# Patient Record
Sex: MALE | Race: WHITE | Employment: OTHER | URBAN - NONMETROPOLITAN AREA
[De-identification: names, ages, dates, MRNs, and addresses within clinical notes are randomized per-mention and may not be internally consistent; named-entity substitution may affect disease eponyms.]

---

## 2019-04-10 ENCOUNTER — OFFICE VISIT (OUTPATIENT)
Dept: CARDIOLOGY CLINIC | Age: 69
End: 2019-04-10
Payer: MEDICARE

## 2019-04-10 VITALS
WEIGHT: 208.2 LBS | HEART RATE: 80 BPM | HEIGHT: 71 IN | SYSTOLIC BLOOD PRESSURE: 116 MMHG | DIASTOLIC BLOOD PRESSURE: 72 MMHG | BODY MASS INDEX: 29.15 KG/M2

## 2019-04-10 DIAGNOSIS — I73.9 CLAUDICATION OF BOTH LOWER EXTREMITIES (HCC): ICD-10-CM

## 2019-04-10 DIAGNOSIS — R68.89 ABNORMAL ANKLE BRACHIAL INDEX (ABI): Primary | ICD-10-CM

## 2019-04-10 PROCEDURE — 99204 OFFICE O/P NEW MOD 45 MIN: CPT | Performed by: INTERNAL MEDICINE

## 2019-04-10 RX ORDER — BISOPROLOL FUMARATE 5 MG/1
5 TABLET ORAL DAILY
COMMUNITY
End: 2020-09-17 | Stop reason: SDUPTHER

## 2019-04-10 RX ORDER — EZETIMIBE 10 MG/1
10 TABLET ORAL DAILY
COMMUNITY
End: 2020-09-17 | Stop reason: SDUPTHER

## 2019-04-10 RX ORDER — CLOPIDOGREL BISULFATE 75 MG/1
75 TABLET ORAL DAILY
COMMUNITY
End: 2020-09-17 | Stop reason: SDUPTHER

## 2019-04-10 RX ORDER — CALCIUM CARBONATE/VITAMIN D3 600 MG-10
TABLET ORAL DAILY
Status: ON HOLD | COMMUNITY
End: 2019-05-07 | Stop reason: ALTCHOICE

## 2019-04-10 RX ORDER — ATORVASTATIN CALCIUM 40 MG/1
40 TABLET, FILM COATED ORAL DAILY
Qty: 90 TABLET | Refills: 1 | Status: SHIPPED | OUTPATIENT
Start: 2019-04-10 | End: 2020-10-19 | Stop reason: SDUPTHER

## 2019-04-10 NOTE — PROGRESS NOTES
New Patient, leg swelling and leg pain. CASSY results scanned in under media. Recent EKG, stress and echo under media tab also.
is occurring with less and less exertion and he is unable to walk on a daily basis with even minimal ambulation without having pain. His RLE hurts more than the LLE. He will likely need bilateral intervention. Continue walking program.  Discussed emergency symptoms needing immediate attention. Discussed diet/exercise/BP/weight loss/health lifestyle choices/lipids; the patient understands the goals and will try to comply.      Disposition:  Angiogram  3-6 months         Electronically signed by Vikas German MD   4/10/2019 at 12:46 PM

## 2019-05-07 ENCOUNTER — HOSPITAL ENCOUNTER (OUTPATIENT)
Dept: INTERVENTIONAL RADIOLOGY/VASCULAR | Age: 69
Discharge: HOME | End: 2019-05-07
Attending: INTERNAL MEDICINE | Admitting: INTERNAL MEDICINE
Payer: MEDICARE

## 2019-05-07 VITALS
BODY MASS INDEX: 28.56 KG/M2 | SYSTOLIC BLOOD PRESSURE: 113 MMHG | HEIGHT: 71 IN | DIASTOLIC BLOOD PRESSURE: 63 MMHG | OXYGEN SATURATION: 98 % | TEMPERATURE: 98.4 F | RESPIRATION RATE: 17 BRPM | WEIGHT: 204 LBS | HEART RATE: 75 BPM

## 2019-05-07 DIAGNOSIS — I73.9 PVD (PERIPHERAL VASCULAR DISEASE) (HCC): ICD-10-CM

## 2019-05-07 LAB
ABO: NORMAL
ACTIVATED CLOTTING TIME: 164 SECONDS (ref 1–150)
ANION GAP SERPL CALCULATED.3IONS-SCNC: 15 MEQ/L (ref 8–16)
ANTIBODY SCREEN: NORMAL
BUN BLDV-MCNC: 9 MG/DL (ref 7–22)
CALCIUM SERPL-MCNC: 9.2 MG/DL (ref 8.5–10.5)
CHLORIDE BLD-SCNC: 96 MEQ/L (ref 98–111)
CHOLESTEROL, TOTAL: 117 MG/DL (ref 100–199)
CO2: 20 MEQ/L (ref 23–33)
CREAT SERPL-MCNC: 0.8 MG/DL (ref 0.4–1.2)
ERYTHROCYTE [DISTWIDTH] IN BLOOD BY AUTOMATED COUNT: 12.9 % (ref 11.5–14.5)
ERYTHROCYTE [DISTWIDTH] IN BLOOD BY AUTOMATED COUNT: 43.8 FL (ref 35–45)
GFR SERPL CREATININE-BSD FRML MDRD: > 90 ML/MIN/1.73M2
GLUCOSE BLD-MCNC: 107 MG/DL (ref 70–108)
HCT VFR BLD CALC: 36.8 % (ref 42–52)
HDLC SERPL-MCNC: 66 MG/DL
HEMOGLOBIN: 12.8 GM/DL (ref 14–18)
INR BLD: 1.21 (ref 0.85–1.13)
LDL CHOLESTEROL CALCULATED: 37 MG/DL
MCH RBC QN AUTO: 32.7 PG (ref 26–33)
MCHC RBC AUTO-ENTMCNC: 34.8 GM/DL (ref 32.2–35.5)
MCV RBC AUTO: 93.9 FL (ref 80–94)
PLATELET # BLD: 135 THOU/MM3 (ref 130–400)
PMV BLD AUTO: 9.8 FL (ref 9.4–12.4)
POTASSIUM REFLEX MAGNESIUM: 4 MEQ/L (ref 3.5–5.2)
RBC # BLD: 3.92 MILL/MM3 (ref 4.7–6.1)
RH FACTOR: NORMAL
SODIUM BLD-SCNC: 131 MEQ/L (ref 135–145)
TRIGL SERPL-MCNC: 71 MG/DL (ref 0–199)
WBC # BLD: 5.6 THOU/MM3 (ref 4.8–10.8)

## 2019-05-07 PROCEDURE — 85610 PROTHROMBIN TIME: CPT

## 2019-05-07 PROCEDURE — C1894 INTRO/SHEATH, NON-LASER: HCPCS

## 2019-05-07 PROCEDURE — 36200 PLACE CATHETER IN AORTA: CPT | Performed by: INTERNAL MEDICINE

## 2019-05-07 PROCEDURE — 75625 CONTRAST EXAM ABDOMINL AORTA: CPT | Performed by: INTERNAL MEDICINE

## 2019-05-07 PROCEDURE — 80048 BASIC METABOLIC PNL TOTAL CA: CPT

## 2019-05-07 PROCEDURE — 6360000004 HC RX CONTRAST MEDICATION: Performed by: INTERNAL MEDICINE

## 2019-05-07 PROCEDURE — 75716 ARTERY X-RAYS ARMS/LEGS: CPT | Performed by: INTERNAL MEDICINE

## 2019-05-07 PROCEDURE — 86900 BLOOD TYPING SEROLOGIC ABO: CPT

## 2019-05-07 PROCEDURE — 36140 INTRO NDL ICATH UPR/LXTR ART: CPT | Performed by: INTERNAL MEDICINE

## 2019-05-07 PROCEDURE — 85027 COMPLETE CBC AUTOMATED: CPT

## 2019-05-07 PROCEDURE — 2500000003 HC RX 250 WO HCPCS

## 2019-05-07 PROCEDURE — 85347 COAGULATION TIME ACTIVATED: CPT

## 2019-05-07 PROCEDURE — 86850 RBC ANTIBODY SCREEN: CPT

## 2019-05-07 PROCEDURE — 80061 LIPID PANEL: CPT

## 2019-05-07 PROCEDURE — 6360000002 HC RX W HCPCS: Performed by: INTERNAL MEDICINE

## 2019-05-07 PROCEDURE — 36415 COLL VENOUS BLD VENIPUNCTURE: CPT

## 2019-05-07 PROCEDURE — 2709999900 HC NON-CHARGEABLE SUPPLY

## 2019-05-07 PROCEDURE — 6360000002 HC RX W HCPCS

## 2019-05-07 PROCEDURE — 2580000003 HC RX 258: Performed by: INTERNAL MEDICINE

## 2019-05-07 PROCEDURE — 75630 X-RAY AORTA LEG ARTERIES: CPT | Performed by: INTERNAL MEDICINE

## 2019-05-07 PROCEDURE — 86901 BLOOD TYPING SEROLOGIC RH(D): CPT

## 2019-05-07 PROCEDURE — C1769 GUIDE WIRE: HCPCS

## 2019-05-07 RX ORDER — SODIUM CHLORIDE 0.9 % (FLUSH) 0.9 %
10 SYRINGE (ML) INJECTION PRN
Status: DISCONTINUED | OUTPATIENT
Start: 2019-05-07 | End: 2019-05-07 | Stop reason: HOSPADM

## 2019-05-07 RX ORDER — ACETAMINOPHEN 325 MG/1
650 TABLET ORAL EVERY 4 HOURS PRN
Status: DISCONTINUED | OUTPATIENT
Start: 2019-05-07 | End: 2019-05-07 | Stop reason: HOSPADM

## 2019-05-07 RX ORDER — ATROPINE SULFATE 0.4 MG/ML
0.5 AMPUL (ML) INJECTION
Status: DISCONTINUED | OUTPATIENT
Start: 2019-05-07 | End: 2019-05-07 | Stop reason: HOSPADM

## 2019-05-07 RX ORDER — SODIUM CHLORIDE 0.9 % (FLUSH) 0.9 %
10 SYRINGE (ML) INJECTION EVERY 12 HOURS SCHEDULED
Status: DISCONTINUED | OUTPATIENT
Start: 2019-05-07 | End: 2019-05-07 | Stop reason: HOSPADM

## 2019-05-07 RX ORDER — HEPARIN SODIUM 1000 [USP'U]/ML
5000 INJECTION, SOLUTION INTRAVENOUS; SUBCUTANEOUS ONCE
Status: COMPLETED | OUTPATIENT
Start: 2019-05-07 | End: 2019-05-07

## 2019-05-07 RX ORDER — MIDAZOLAM HYDROCHLORIDE 1 MG/ML
2 INJECTION INTRAMUSCULAR; INTRAVENOUS ONCE
Status: COMPLETED | OUTPATIENT
Start: 2019-05-07 | End: 2019-05-07

## 2019-05-07 RX ORDER — ASPIRIN 81 MG/1
324 TABLET, CHEWABLE ORAL ONCE
Status: DISCONTINUED | OUTPATIENT
Start: 2019-05-07 | End: 2019-05-07 | Stop reason: HOSPADM

## 2019-05-07 RX ORDER — SODIUM CHLORIDE 0.9 % (FLUSH) 0.9 %
10 SYRINGE (ML) INJECTION EVERY 12 HOURS SCHEDULED
Status: DISCONTINUED | OUTPATIENT
Start: 2019-05-07 | End: 2019-05-07 | Stop reason: SDUPTHER

## 2019-05-07 RX ORDER — SODIUM CHLORIDE 9 MG/ML
INJECTION, SOLUTION INTRAVENOUS CONTINUOUS
Status: DISCONTINUED | OUTPATIENT
Start: 2019-05-07 | End: 2019-05-07 | Stop reason: ALTCHOICE

## 2019-05-07 RX ORDER — SODIUM CHLORIDE 9 MG/ML
INJECTION, SOLUTION INTRAVENOUS CONTINUOUS
Status: DISCONTINUED | OUTPATIENT
Start: 2019-05-07 | End: 2019-05-07 | Stop reason: HOSPADM

## 2019-05-07 RX ORDER — FENTANYL CITRATE 50 UG/ML
100 INJECTION, SOLUTION INTRAMUSCULAR; INTRAVENOUS ONCE
Status: COMPLETED | OUTPATIENT
Start: 2019-05-07 | End: 2019-05-07

## 2019-05-07 RX ADMIN — MIDAZOLAM 2 MG: 1 INJECTION INTRAMUSCULAR; INTRAVENOUS at 11:04

## 2019-05-07 RX ADMIN — SODIUM CHLORIDE: 9 INJECTION, SOLUTION INTRAVENOUS at 09:51

## 2019-05-07 RX ADMIN — FENTANYL CITRATE 100 MCG: 50 INJECTION, SOLUTION INTRAMUSCULAR; INTRAVENOUS at 11:04

## 2019-05-07 RX ADMIN — IOPAMIDOL 175 ML: 612 INJECTION, SOLUTION INTRAVENOUS at 12:27

## 2019-05-07 RX ADMIN — HEPARIN SODIUM 5000 UNITS: 1000 INJECTION INTRAVENOUS; SUBCUTANEOUS at 11:40

## 2019-05-07 ASSESSMENT — PAIN SCALES - GENERAL
PAINLEVEL_OUTOF10: 0
PAINLEVEL_OUTOF10: 0

## 2019-05-07 NOTE — FLOWSHEET NOTE
Iv fluids stopped. Iv catheter removed. Telemetry off. Bilateral groins stable. Discharge instructions with AVS review completed. Significant other present. quesitons and concerns addressed. Preparing for discharge.

## 2019-05-07 NOTE — FLOWSHEET NOTE
Patient admitted to 2E02 Ambulatory for arteriogram.  Patient NPO. Patient accompanied by his significant other. Vital signs obtained. Assessment and data collection intiated. Oriented to room. Policies and procedures for 2E explained. All questions answered with no further questions at this time. Fall prevention and safety precautions discussed with patient.

## 2019-05-07 NOTE — PROGRESS NOTES
1205 Sheath in right femoral artery inadvertantly pulled and manual pressure applied per Dr Az Pedro. 1210 Angiogram left leg complete. 1213 Right femoral artery re-accessed with 4 fr sheath. 1222 Procedure complete. ACT drawn. 1226 . Dr Az Pedro informed and ok to pull femoral lines. 1230 Sheath in left femoral artery pulled and manual pressure applied per A [place] RT. No bleeding noted. 1242 Manual pressure removed. No bleeding or hematoma noted. Band-aid applied. 1243 Sheath in right femoral artery pulled and manual pressure applied per A [place] RT. No bleeding noted. Report called to [employer]. 1253 Manual pressure removed. No bleeding or hematoma noted. Band-aid applied. 1256 Pt positioned in bed for comfort. 1307 Transferred to  per bed. Stable condition.

## 2019-05-07 NOTE — FLOWSHEET NOTE
Received from specials. Bilateral groin sites stable. 0.9 normal saline cont. Taking sips of water. Pt aware to keep both legs still and to not cross them or lift his head. Resting with easy resp. Denies pain or needs. Family present.

## 2019-05-07 NOTE — PRE SEDATION
[address] [address]  Sedation/Analgesia History & Physical    Pt Name: Sienna Salazar  Account number: [de-identified]  MRN: [id number]  YOB: 1950  Provider Performing Procedure: Adolfo Santillan MD  Referring Provider: Adolfo Santillan MD   Primary Care Physician: Nilton Badillo  Date: 5/7/2019    PRE-PROCEDURE    Code Status: FULL CODE  Brief History/Pre-Procedure Diagnosis:   Life-limiting claudication--progressing to rest pain  Severe bilateral fem-pop disease    Consent: : I have discussed with the patient risks, benefits, and alternatives (and relevant risks, benefits, and side effects related to alternatives or not receiving care), and likelihood of the success. The patient and/or representative appear to understand and agree to proceed. The discussion encompasses risks, benefits, and side effects related to the alternatives and the risks related to not receiving the proposed care, treatment, and services. The indication, risks and benefits of the procedure and possible therapeutic consequences and alternatives were discussed with the patient. The patient was given the opportunity to ask questions and to have them answered to his/her satisfaction. Risks of the procedure include but are not limited to the following: Bleeding, hematoma including retroperitoneal hemmorhage, infection, pain and discomfort, injury to the aorta and other blood vessels, rhythm disturbance, low blood pressure, myocardial infarction, stroke, kidney damage/failure, myocardial perforation, allergic reactions to sedatives/contrast material, loss of pulse/vascular compromise requiring surgery, aneurysm/pseudoaneurysm formation, possible loss of a limb/hand/leg, needing blood transfusion, requiring emergent open heart surgery or emergent coronary intervention, the need for intubation/respiratory support, the requirement for defibrillation/cardioversion, and death.  Alternatives to and omission of the suggested procedure were discussed. The patient had no further questions and wished to proceed; the consent form was signed. MEDICAL HISTORY  []ASHD/ANGINA/MI/CHF   [x]Hypertension  []Diabetes  [x]Hyperlipidemia  []Smoking  []Family Hx of ASHD  [x]Additional information:       has a past medical history of Arthritis, Electrolyte imbalance, Gout, HTN (hypertension), Hyperlipidemia, Hypochloremia, and Hyponatremia. SURGICAL HISTORY   has a past surgical history that includes back surgery (2017). Additional information:       ALLERGIES   Allergies as of 05/07/2019    (No Known Allergies)     Additional information:       MEDICATIONS   Aspirin  [x] 81 mg  [] 325 mg  [] None  Antiplatelet drug therapy use last 5 days  []No [x]Yes  Coumadin Use Last 5 Days [x]No []Yes  Other anticoagulant use last 5 days  [x]No []Yes    Current Facility-Administered Medications:     0.9 % sodium chloride infusion, , Intravenous, Continuous, Luiza Jose MD, Last Rate: 75 mL/hr at 05/07/19 0951    sodium chloride flush 0.9 % injection 10 mL, 10 mL, Intravenous, 2 times per day, Luiza Jose MD    aspirin chewable tablet 324 mg, 324 mg, Oral, Once, Luiza Jose MD  Prior to Admission medications    Medication Sig Start Date End Date Taking?  Authorizing Provider   Omega-3 Fatty Acids (OMEGA-3 FISH OIL PO) Take 2,080 mg by mouth 2 times daily   Yes Historical Provider, MD   Calcium Carbonate (CALCIUM 600 PO) Take 1 tablet by mouth 2 times daily   Yes Historical Provider, MD   bisoprolol (ZEBETA) 5 MG tablet Take 5 mg by mouth daily   Yes Historical Provider, MD   ezetimibe (ZETIA) 10 MG tablet Take 10 mg by mouth daily   Yes Historical Provider, MD   clopidogrel (PLAVIX) 75 MG tablet Take 75 mg by mouth daily   Yes Historical Provider, MD   atorvastatin (LIPITOR) 40 MG tablet Take 1 tablet by mouth daily 4/10/19  Yes Luiza Jose MD   terazosin (HYTRIN) 5 MG capsule TAKE 1 CAPSULE DAILY 7/12/13  Yes Vida Mosqueda MD lisinopril (PRINIVIL;ZESTRIL) 40 MG tablet Take 40 mg by mouth daily. Yes Historical Provider, MD   aspirin 81 MG tablet Take 81 mg by mouth daily. Yes Historical Provider, MD   amLODIPine (NORVASC) 10 MG tablet Take 10 mg by mouth daily. Yes Historical Provider, MD   GARLIC Take 0,012 mg by mouth daily    Yes Historical Provider, MD     Additional information:       VITAL SIGNS   Vitals:    05/07/19 0945   BP: 126/65   Pulse: 68   Resp: 16   Temp: 98.5 °F (36.9 °C)   SpO2: 97%       PHYSICAL:   General: No acute distress  HEENT:  Unremarkable for age  Neck: without increased JVD, carotid pulses 2+ bilaterally without bruits  Heart: RRR, S1 & S2 WNL, S4 gallop, without murmurs or rubs   Lungs: Clear to auscultation    Abdomen: BS present, without HSM, masses, or tenderness    Extremities: without C,C,E.  Pulses 1+ bilaterally  Mental Status: Alert & Oriented        PLANNED PROCEDURE   []Cath  []PCI                []Pacemaker/AICD  []ACOSTA             []Cardioversion [x]Peripheral angiography/PTA  []Other:      SEDATION  Planned agent:[x]Midazolam []Meperidine [x]Sublimaze []Morphine  []Diazepam  []Other:     ASA Classification:  []1 []2 [x]3 []4 []5  Class 1: A normal healthy patient  Class 2: Pt with mild to moderate systemic disease  Class 3: Severe systemic disease or disturbance  Class 4: Severe systemic disorders that are already life threatening. Class 5: Moribund pt with little chances of survival, for more than 24 hours. Mallampati I Airway Classification:   []1 []2 [x]3 []4    [x]Pre-procedure diagnostic studies complete and results available. Comment:    [x]Previous sedation/anesthesia experiences assessed. Comment:    [x]The patient is an appropriate candidate to undergo the planned procedure sedation and anesthesia.  (Refer to nursing sedation/analgesia documentation record)  [x]Formulation and discussion of sedation/procedure plan, risks, and expectations with patient and/or responsible adult completed. [x]Patient examined immediately prior to the procedure.  (Refer to nursing sedation/analgesia documentation record)    Abdiaziz Peralta MD   Electronically signed 5/7/2019 at 10:41 AM

## 2019-05-07 NOTE — PLAN OF CARE
Problem: Discharge Planning:  Goal: Participates in care planning  Description  Participates in care planning  Outcome: Ongoing  Goal: Discharged to appropriate level of care  Description  Discharged to appropriate level of care  Outcome: Ongoing     Problem: Tissue Perfusion - Cardiopulmonary, Altered:  Goal: Absence of angina  Description  Absence of angina  Outcome: Ongoing  Goal: Hemodynamic stability will improve  Description  Hemodynamic stability will improve  Outcome: Ongoing

## 2019-05-07 NOTE — FLOWSHEET NOTE
Found dtr holding manual pressure to right groin site. Took over pressure with quick clot for 5 minutes. Site stable. dtr states \"it was oozing underbandaid and there is blood in urinal\". No oozing noted per writer.

## 2019-05-07 NOTE — PROGRESS NOTES
1035 pt and family to specials per bed. Procedure explained and consent signed per dr Jeanne Martinez. Monitor applied. Pt noted with pulses with doppler to pedal and post tib. Left pedal is palpable. Pt states he has calf pain and cramps with walking with sometimes a little tingling . pt on asa/plavix   1045 assist pt to table. Right femerol area prepped. 1110  procedure started per dr Jeanne Martinez to right femerol area   1120 left femerol area prepped.     1124 access gained to left femerol area   1200 report given to winter gabriel rn

## 2019-05-08 ENCOUNTER — TELEPHONE (OUTPATIENT)
Dept: CARDIOLOGY CLINIC | Age: 69
End: 2019-05-08

## 2019-05-08 DIAGNOSIS — I73.9 PAD (PERIPHERAL ARTERY DISEASE) (HCC): Primary | ICD-10-CM

## 2019-05-08 NOTE — PROCEDURES
[address] [place] [address]                            CARDIAC CATHETERIZATION    PATIENT NAME: Monie Youngblood                     :        1950  MED REC NO:   [id number]                           ROOM:       0002  ACCOUNT NO:   [de-identified]                           ADMIT DATE: 2019  PROVIDER:     Sarath Candelario MD    DATE OF PROCEDURE:  2019    PERIPHERAL ANGIOGRAM/INTERVENTION    INDICATION FOR PROCEDURE:  Severe life-limiting claudication, [place]  Heart class III with duplex showing bilateral fem-pop occlusion. DESCRIPTION OF PROCEDURE:  After informed consent was obtained from the  patient, he was brought to the special procedure suite and prepped in  sterile fashion. Left femoral artery was chosen as the primary point of  access. Preprocedure timeout was completed. After infiltration of the  right inguinal region with 2% lidocaine, using micropuncture, modified  Seldinger technique, and fluoroscopic guidance, I was able to insert a  5-Peruvian sheath into the right femoral artery. I then performed  standard digital subtraction angiography using a 5-Peruvian VCF catheter  inserted over a 0.035 angled Glidewire. PERIPHERAL ANGIOGRAM:  ABDOMINAL AORTA:  No significant aneurysm or dissection/narrowing noted. RENAL ARTERIES:  Appeared to be bilaterally patent. COMMON ILIAC ARTERIES:  Left common iliac artery is completely occluded  at the ostium. Right common iliac artery appears to be patent without  any significant obstruction. EXTERNAL ILIAC ARTERIES:  Left external iliac artery appears to fill  with its collaterals and is heavily calcified and appears to be patent. Right external iliac artery is patent. COMMON FEMORAL ARTERIES:  Bilaterally patent, but the right common  femoral artery appears to have severe calcification and narrowing  suggestive of 70% to 80% stenosis in the left lower extremity.   SFA: Patent with diffuse calcification with no significant obstruction. POPLITEAL ARTERY:  Has severe calcification across the popliteal artery  culminating in what appears to be 90% stenosis. TIBIOPERONEAL ARTERIES:  The right AT and TP trunk are patent. The  peroneal artery has mild disease. The posterior tibial has about 60%  stenosis in the proximal segment. There is flow into the left foot from  all three vessels. PEDAL ARTERIES:  There is three-vessel runoff to the foot with good flow  into the dorsalis pedis and the posterior tibial arteries. RIGHT LOWER EXTREMITY:  COMMON FEMORAL ARTERIES:  Appear to be patent again, but there is heavy  calcification and there is also a narrowing of  approximately 80%. SFA:  Patent in the proximal segment. Right SFA has severe  calcification and distally, the SFA occludes and reconstitutes the  profunda collaterals. POPLITEAL ARTERY:  Popliteal artery then is also occluded and  reconstitutes via profunda collaterals. TIBIOPERONEAL ARTERIES:  The anterior tibial artery as well as TP trunk  are patent. There is three-vessel runoff_ in all three tibial vessels  proximally. The posterior tibial artery appears to occlude mid vessel. The peroneal artery appears to be diffusely diseased. The anterior  tibial artery continues down to the foot. The peroneal artery is very  slow filling and does flow all the way to the ankle. The posterior  tibial artery again is occluded. PEDAL ARTERIES:  The anterior tibial artery is a dominant vessel to the  foot and provides the dorsalis pedis artery. The peroneal artery makes  it to the ankle, but did not make it onto the foot. The anterior tibial  artery appears to collateralize the posterior tibial artery zone via the  pedal arch. The peroneal artery also appears to supply collaterals  through the posterior tibial artery.     INTERVENTION:  Given the calcific left common iliac  artery occlusion, I did elect to attempt limited intervention to see if a  wire would pass through the occlusion. I did upsize the right common  femoral sheath to a 6-Welsh ANL, placed this at the bifurcation of the  common iliac artery and tried to traverse the  gently and the wire  would not cross. I tried in the retrograde fashion as well from the  left common femoral access with an angled glide catheter, but again, it  would not cross. This implies significant chronicity in calcifications  with high risk for any endovascular approach, and therefore, surgical  management at this time the best option. A bolus of 5000 units of  heparin was given and the ACT at the end of the case is confirmed to be  less than 180 seconds. ACCESS:  Manual pressure was used for hemostasis. IMMEDIATE COMPLICATIONS:  None. MEDICATIONS:  See EMR. SUMMARY:  Occluded/calcified left common iliac artery; left popliteal artery  ; right common femoral calcific stenosis; right SFA/popliteal  occlusion; left foot has 3-vessel runoff; right foot has at least  one-vessel runoff. PLAN:  1. Refer to Vascular Surgery. 2.  Bed rest.  3.  Optimal medical therapy. 4.  Risk factor management. 5.  IV fluids. 6.  Routine access site care. 7.  Follow up with myself in one to two weeks post procedure. All the above was explained to the patient and the patient's family. They are agreeable and amenable to the above plan.     Adam Sanchez MD    D: 05/07/2019 13:25:38       T: 05/07/2019 15:22:15     [email or web address]  Job#: [id number]     Doc#: [id number]    CC:

## 2019-05-08 NOTE — TELEPHONE ENCOUNTER
Verbal order from Dr. Burke Bernheim- Refer patient to Dr. Lesa Flores at [address] [place] [address] for vascular surgery for severe PAD. Given to scheduling. Please agree Dr. Burke Bernheim.

## 2019-05-09 NOTE — PROGRESS NOTES
Call placed. Spoke with patient directly. Patient denies having post procedure pain. Patient denies bleeding or edema from puncture site. Patient states there is a small bruise on his right thigh. Patient denies having any post procedure questions.

## 2019-06-07 ENCOUNTER — TELEPHONE (OUTPATIENT)
Dept: CARDIOLOGY CLINIC | Age: 69
End: 2019-06-07

## 2019-06-07 DIAGNOSIS — Z01.818 PRE-OP EXAM: Primary | ICD-10-CM

## 2019-06-07 DIAGNOSIS — R06.02 SOB (SHORTNESS OF BREATH) ON EXERTION: ICD-10-CM

## 2019-06-07 NOTE — TELEPHONE ENCOUNTER
JOSE D SCHEDULED FOR 6-19-19 @ 930 AM.    PATIENT NOTIFIED AND GIVEN INSTRUCTIONS OVER THE PHONE. INSTRUCTIONS ALSO MAILED TO PATIENT.

## 2019-06-17 ENCOUNTER — TELEPHONE (OUTPATIENT)
Dept: CARDIOLOGY CLINIC | Age: 69
End: 2019-06-17

## 2019-06-17 NOTE — TELEPHONE ENCOUNTER
Patients sig other Milderd Smiling (on hipaa) calling in for patient who is scheduled for a hospital follow up appointment with Dr Oscar Black on 6/19/19. She said Dr Oscar Black referred him to Dr Emeli Zarco and he is scheduled for a stress test on 6/20/19 prior to having a surgery with Dr Kashif German office. She is asking if he needs to keep the 6/19/19 with Dr Oscar Black or if it can be pushed out a bit? Please call the patient to advise.

## 2019-06-19 ENCOUNTER — HOSPITAL ENCOUNTER (OUTPATIENT)
Dept: NON INVASIVE DIAGNOSTICS | Age: 69
Discharge: HOME | End: 2019-06-19
Payer: MEDICARE

## 2019-06-19 VITALS — WEIGHT: 206 LBS | HEIGHT: 71 IN | BODY MASS INDEX: 28.84 KG/M2

## 2019-06-19 DIAGNOSIS — R06.02 SOB (SHORTNESS OF BREATH) ON EXERTION: ICD-10-CM

## 2019-06-19 DIAGNOSIS — Z01.818 PRE-OP EXAM: ICD-10-CM

## 2019-06-19 PROCEDURE — 2709999900 HC NON-CHARGEABLE SUPPLY

## 2019-06-19 PROCEDURE — 78452 HT MUSCLE IMAGE SPECT MULT: CPT | Performed by: INTERNAL MEDICINE

## 2019-06-19 PROCEDURE — 93017 CV STRESS TEST TRACING ONLY: CPT | Performed by: INTERNAL MEDICINE

## 2019-06-19 PROCEDURE — 3430000000 HC RX DIAGNOSTIC RADIOPHARMACEUTICAL: Performed by: INTERNAL MEDICINE

## 2019-06-19 PROCEDURE — A9500 TC99M SESTAMIBI: HCPCS | Performed by: INTERNAL MEDICINE

## 2019-06-19 PROCEDURE — 6360000002 HC RX W HCPCS

## 2019-06-19 RX ADMIN — Medication 9.3 MILLICURIE: at 12:00

## 2019-06-19 RX ADMIN — Medication 30.5 MILLICURIE: at 13:15

## 2019-09-18 ENCOUNTER — OFFICE VISIT (OUTPATIENT)
Dept: CARDIOLOGY CLINIC | Age: 69
End: 2019-09-18
Payer: MEDICARE

## 2019-09-18 VITALS
WEIGHT: 210 LBS | BODY MASS INDEX: 28.44 KG/M2 | HEIGHT: 72 IN | SYSTOLIC BLOOD PRESSURE: 114 MMHG | HEART RATE: 64 BPM | DIASTOLIC BLOOD PRESSURE: 62 MMHG

## 2019-09-18 DIAGNOSIS — I73.9 PAD (PERIPHERAL ARTERY DISEASE) (HCC): Primary | ICD-10-CM

## 2019-09-18 PROBLEM — I70.213 ATHEROSCLEROSIS OF NATIVE ARTERY OF BOTH LOWER EXTREMITIES WITH INTERMITTENT CLAUDICATION (HCC): Status: ACTIVE | Noted: 2019-06-19

## 2019-09-18 PROBLEM — Z98.890 HISTORY OF VASCULAR SURGERY: Status: ACTIVE | Noted: 2019-09-11

## 2019-09-18 PROCEDURE — 99213 OFFICE O/P EST LOW 20 MIN: CPT | Performed by: INTERNAL MEDICINE

## 2019-09-18 RX ORDER — FUROSEMIDE 20 MG/1
20 TABLET ORAL PRN
COMMUNITY
Start: 2019-09-07 | End: 2021-12-14

## 2019-09-18 NOTE — PROGRESS NOTES
tobacco. He reports that he drinks alcohol. He reports that he has current or past drug history. Drug: Marijuana. Family History  Jada Baird family history includes Diabetes in his mother. There is no family history of bicuspid aortic valve, aneurysms, heart transplant, pacemakers, defibrillators, or sudden cardiac death. Past Surgical History   Past Surgical History:   Procedure Laterality Date    BACK SURGERY  2017    FEMORAL-FEMORAL BYPASS GRAFT  07/01/2019    [facility] with Dr. Theresa Dejesus       Review of Systems   Constitutional: Negative for chills and fever  HENT: Negative for congestion, sinus pressure, sneezing and sore throat. Eyes: Negative for pain, discharge, redness and itching. Respiratory: Negative for apnea, cough  Gastrointestinal: Negative for blood in stool, constipation, diarrhea   Endocrine: Negative for cold intolerance, heat intolerance, polydipsia. Genitourinary: Negative for dysuria, enuresis, flank pain and hematuria. Musculoskeletal: Negative for arthralgias, joint swelling and neck pain. Neurological: Negative for numbness and headaches. Psychiatric/Behavioral: Negative for agitation, confusion, decreased concentration and dysphoric mood. Objective:     /62   Pulse 64   Ht 5' 11.5\" (1.816 m)   Wt 210 lb (95.3 kg)   BMI 28.88 kg/m²     Wt Readings from Last 3 Encounters:   09/18/19 210 lb (95.3 kg)   06/19/19 206 lb (93.4 kg)   05/07/19 204 lb (92.5 kg)     BP Readings from Last 3 Encounters:   09/18/19 114/62   05/07/19 113/63   04/10/19 116/72       Nursing note and vitals reviewed. Physical Exam   Constitutional: Oriented to person, place, and time. Appears well-developed and well-nourished. HENT:   Head: Normocephalic and atraumatic. Eyes: EOM are normal. Pupils are equal, round, and reactive to light. Neck: Normal range of motion. Neck supple. No JVD present. Cardiovascular: Normal rate, regular rhythm, normal heart sounds and intact distal pulses. No murmur heard. Pulmonary/Chest: Effort normal and breath sounds normal. No respiratory distress. No wheezes. No rales. Abdominal: Soft. Bowel sounds are normal. No distension. There is no tenderness. Musculoskeletal: Normal range of motion. No edema. Neurological: Alert and oriented to person, place, and time. No cranial nerve deficit. Coordination normal.   Skin: Skin is warm and dry. Psychiatric: Normal mood and affect. No results found for: CKTOTAL, CKMB, CKMBINDEX    Lab Results   Component Value Date    WBC 5.6 05/07/2019    RBC 3.92 05/07/2019    RBC 4.20 02/01/2012    HGB 12.8 05/07/2019    HCT 36.8 05/07/2019    HCT 41.3 02/01/2012    MCV 93.9 05/07/2019    MCH 32.7 05/07/2019    MCHC 34.8 05/07/2019    RDW 11.8 02/01/2012     05/07/2019    MPV 9.8 05/07/2019       Lab Results   Component Value Date     05/07/2019    K 4.0 05/07/2019    CL 96 05/07/2019    CO2 20 05/07/2019    BUN 9 05/07/2019    CREATININE 0.8 05/07/2019    CALCIUM 9.2 05/07/2019    LABGLOM >90 05/07/2019    GLUCOSE 107 05/07/2019    GLUCOSE 120 04/28/2015       Lab Results   Component Value Date    BILITOT NEGATIVE 02/21/2012       No results found for: MG    Lab Results   Component Value Date    INR 1.21 (H) 05/07/2019         No results found for: LABA1C    Lab Results   Component Value Date    TRIG 71 05/07/2019    HDL 66 05/07/2019    LDLCALC 37 05/07/2019       No results found for: TSH      Testing Reviewed:      I have individually reviewed the cardiac test below:    ECHO: No results found for this or any previous visit. Assessment/Plan   S/p Fem-Fem, 71/2019  Severe Fem-Pop Disease -  Not revascularized- treated medically with walking program  Preserved EF  Chronic Diastolic CHF, NYHA II  Doing well, continue walking program.  Tolerating medications. No bleeding. Does not want ASA. LDL 37. If he becomes symptomatic or getting worse will follow with vascular studies--he is getting C[facility]Y at [facility]. Discussed diet/exercise/BP/weigh loss/health lifestyle choices/lipids; the patient understands the goals and will try to comply.     Disposition:  6 months         Electronically signed by Hernandez Wren MD   9/18/2019 at 9:21 AM

## 2020-03-11 ENCOUNTER — TELEPHONE (OUTPATIENT)
Dept: CARDIOLOGY CLINIC | Age: 70
End: 2020-03-11

## 2020-05-13 ENCOUNTER — TELEPHONE (OUTPATIENT)
Dept: CARDIOLOGY CLINIC | Age: 70
End: 2020-05-13

## 2020-05-19 NOTE — TELEPHONE ENCOUNTER
Spoke with pt. Pt is needing CASSY scheduled. CASSY scheduled for 5-20-20 at 9AM.  All instructions given to pt.

## 2020-05-20 ENCOUNTER — HOSPITAL ENCOUNTER (OUTPATIENT)
Dept: INTERVENTIONAL RADIOLOGY/VASCULAR | Age: 70
Discharge: HOME | End: 2020-05-20
Payer: MEDICARE

## 2020-05-20 PROCEDURE — 93922 UPR/L XTREMITY ART 2 LEVELS: CPT

## 2020-05-21 ENCOUNTER — OFFICE VISIT (OUTPATIENT)
Dept: CARDIOLOGY CLINIC | Age: 70
End: 2020-05-21
Payer: COMMERCIAL

## 2020-05-21 VITALS
WEIGHT: 216.4 LBS | SYSTOLIC BLOOD PRESSURE: 112 MMHG | HEIGHT: 70 IN | DIASTOLIC BLOOD PRESSURE: 62 MMHG | HEART RATE: 80 BPM | BODY MASS INDEX: 30.98 KG/M2

## 2020-05-21 PROCEDURE — 99213 OFFICE O/P EST LOW 20 MIN: CPT | Performed by: INTERNAL MEDICINE

## 2020-05-21 NOTE — PROGRESS NOTES
distal pulses. No murmur heard. Pulmonary/Chest: Effort normal and breath sounds normal. No respiratory distress. No wheezes. No rales. Abdominal: Soft. Bowel sounds are normal. No distension. There is no tenderness. Musculoskeletal: Normal range of motion. No edema. Neurological: Alert and oriented to person, place, and time. No cranial nerve deficit. Coordination normal.   Skin: Skin is warm and dry. Psychiatric: Normal mood and affect. No results found for: CKTOTAL, CKMB, CKMBINDEX    Lab Results   Component Value Date    WBC 5.6 05/07/2019    RBC 3.92 05/07/2019    RBC 4.20 02/01/2012    HGB 12.8 05/07/2019    HCT 36.8 05/07/2019    HCT 41.3 02/01/2012    MCV 93.9 05/07/2019    MCH 32.7 05/07/2019    MCHC 34.8 05/07/2019    RDW 11.8 02/01/2012     05/07/2019    MPV 9.8 05/07/2019       Lab Results   Component Value Date     05/07/2019    K 4.0 05/07/2019    CL 96 05/07/2019    CO2 20 05/07/2019    BUN 9 05/07/2019    CREATININE 0.8 05/07/2019    CALCIUM 9.2 05/07/2019    LABGLOM >90 05/07/2019    GLUCOSE 107 05/07/2019    GLUCOSE 120 04/28/2015       Lab Results   Component Value Date    BILITOT NEGATIVE 02/21/2012       No results found for: MG    Lab Results   Component Value Date    INR 1.21 (H) 05/07/2019         No results found for: LABA1C    Lab Results   Component Value Date    TRIG 71 05/07/2019    HDL 66 05/07/2019    LDLCALC 37 05/07/2019       No results found for: TSH      Testing Reviewed:      I have individually reviewed the cardiac test below:    ECHO: No results found for this or any previous visit. Assessment/Plan   S/p Fem-Fem, 7/1/2019  Severe Fem-Pop Disease -  Not revascularized- treated medically with walking program  Preserved EF  Chronic Diastolic CHF, NYHA II  Surveillance CASSY 5/20/20:  R CASSY  0.83 and L 1.09  Erick 2 Claudication  Pletal intolerance  Doing well, but needs to do PAD rehab, no CLI or rest pain.   If life-limiting claudication, would need CTA with run-off as he still has SFA disease. Continue all other medications. Discussed diet/exercise/BP/weight loss/health lifestyle choices/lipids; the patient understands the goals and will try to comply.       Disposition:  6 months         Electronically signed by Ruben Alford MD   5/21/2020 at 12:27 PM EDT

## 2020-06-05 ENCOUNTER — TELEPHONE (OUTPATIENT)
Dept: CARDIOLOGY CLINIC | Age: 70
End: 2020-06-05

## 2020-06-29 ENCOUNTER — TELEPHONE (OUTPATIENT)
Dept: CARDIOLOGY CLINIC | Age: 70
End: 2020-06-29

## 2020-07-13 NOTE — PROGRESS NOTES
Hospital Facility-Based Program  [facility]  PHYSICIAN ORDER  Class I Level A Recommendation  Medical Director:  Dr. Vashti Dubois MD    Patient Name: Kyle Wilkerson : 1950  Referring Physician: Dr. Janneth Dobbins  Date: 2020    Diagnosis:    -() Atherosclerosis of arteries of extremities with Intermittent Claudication, right leg (I70.211)  -() Atherosclerosis of arteries of extremities with Intermittent Claudication, left leg (I70.212)  -(X) Atherosclerosis of arteries of extremities with Intermittent Claudication, bilateral (M85.170)  -() Peripheral Venous Disease, unspecified (I73.9)  -() Other Diagnoses (I70.20, I70.21, I70.22)    I authorize the Cardiovascular Rehabilitation Department to:  · If the patient is a current smoker, schedule a consult with the Tobacco Use Intervention session, if needed  · Schedule a consultation with a dietitian, if needed. In accordance with CMS regulations, I affirm that I have provided the patient with information regarding cardiovascular disease and PAD risk factor reduction. Limitations or specific recommendations you want your patient to follow:  (x) Initiate and progress according to fatigue, pain, hemodynamics  () Other:______________________________________    EXERCISE PRESCRIPTION recommendations for this patient:  Frequency: 3x / wk for 36 sessions   Intensity: Walking or Nustep from Initial Isidro walking Test (speed and grade that brings on a 3 out of 4 on pain scale within 8 minutes). Resume when relieved for up to 30 minutes of total walking/Nustep time. Intensity should remain the same for the whole session. Type: Intermittent Aerobic (preferably walking) and Upper Body / Lower Body Strength training.  Modalities may include Treadmill, [employer], Upper Body Ergometry, Nustep, Resistance Training (RT)   Time: 31-60 minutes of Treatment; including Aerobic, RT, and rest time   Progression: Once the patient can exercise 8 minutes at the initial workload speed without a 3 out of 4 pain, then the grade is increased by 1-2% each following training session up to 10% total.  Once the patient can walk at that speed with a 10% incline for 8 minutes, the speed may be increased by 0.1-0.2 mph at each following session, up to 3.0 mph. If patient can walk 3.0/10% for 8 minutes or more, then the grade is increased by 1-2% until 15%. Increase RT if able to do 12-15 repetitions of the current weight. Intensities and times may be adjusted by the staff if appropriate.         This patient agrees to participate in the vascular rehabilitation program.  The referring physician and/or medical director will be available for medical care of the patient throughout his/her participation in the program.    Savita Wade  Exercise Physiologist  Cardiopulmonary and Vascular Rehabilitation

## 2020-07-21 ENCOUNTER — HOSPITAL ENCOUNTER (OUTPATIENT)
Dept: CARDIAC REHAB | Age: 70
Setting detail: THERAPIES SERIES
Discharge: HOME | End: 2020-07-21
Payer: MEDICARE

## 2020-07-21 PROCEDURE — 93668 PERIPHERAL VASCULAR REHAB: CPT

## 2020-07-21 NOTE — PROGRESS NOTES
[address] [facility]    Patient Name:  Nathalie Green  :  1950  Age:  77 y.o. MRN:  [id number]    Physician:  Lev Cooley  Next Office Visit:  20  Allergies: No Known Allergies          INITIAL  ASSESSEMENT 30 DAY  ASSESSMENT 60 DAY  ASSESSMENT FINAL  ASSESSMENT   DATE: 2020 DATE: DATE: DATE:   SESSION #: 1 SESSIONS #: SESSIONS #: SESSIONS #:      Last session completed on:         EXERCISE ASSESSMENT EXERCISE ASSESSMENT EXERCISE ASSESSMENT EXERCISE ASSESSMENT   NUSTEP Exercise Test   Isidro Exercise Test   Completed:  [x] Yes    [] No   Completed:  [] Yes    [] No   Pain Free NUSTEO distance= 287 sec    Total NUSTEP Time = 350sec   Pain Free walking distance =  sec      Total Walking Time =   sec         Starting NUSTEP level on test = Load 1    Ending NS level on test =LOAD3  Current TM Levels =   mph @ %   Current TM Levels =   mph @ %   Ending TM levels =   mph @ %   Vitals Vitals Vitals Vitals   Resting HR =  90  Max HR = 96  Heart Rhythm: NSR  CASSY= R: 0.83 L:1.09  Resting Blood Pressure = 132/76 Resting HR =        Max HR =    Systolic Arm =  Systolic Leg =  CASSY: Leg/Arm =    Resting Blood Pressure = Resting HR =        Max HR =     Systolic Arm =  Systolic Leg =  CASSY: Leg/Arm =    Resting Blood Pressure =  Resting HR =        Max HR =    Systolic Arm =  Systolic Leg =  CASSY: Leg/Arm =    Resting Blood Pressure =   Primary Exercise Goals Primary Exercise Goals Primary Exercise Goals Primary Exercise Goals   *Reduce overall ambulatory claudication pain  *Increase pain free walking distance.  Is patient making progress towards goal?  [] Yes    [] No  Explain:  Is patient making progress towards goal?  [] Yes    [] No  Explain: Patient achieved goal?  [] Yes    [] No  Explain:   Secondary Exercise Goals Secondary Exercise Goals Secondary Exercise Goals Secondary Exercise Goals   *Increase strength and endurance so Lydia Spain is able to walk longer distances without having to stop due to pain Is Lydia Spain making progress towards goal?  [] Yes    [] No  Explain: Is Lydia Spain making progress towards goal?  [] Yes    [] No  Explain: Lydia Spain achieved goal?  [] Yes    [] No  Explain:   EXERCISE PRESCRIPTION EXERCISE PRESCRIPTION EXERCISE PRESCRIPTION EXERCISE PRESCRIPTION   F.I.T.T. F.I.T.T. F.I.T.T. F.I.T.T. Frequency:  3x/wk Frequency:  3x/wk Frequency:  3x/wk Frequency:  3x/wk   Intensity: Up to 3 out of 4 pain Intensity:  Up to 3 out of 4 pain Intensity:  Up to 3 out of 4 pain Intensity:  Up to 3 out of 4 pain   Time:   20-35 total minutes on NS Time:  min/mode  30-45 total minutes on TM Time:  min/mode  30-45 total minutes on TM Time:  min/mode  30-45 total minutes on TM   Type:   Type:   Type:   Type:     []Treadmill  [x]NuStep  [x]Resistance Exercise []Treadmill  []NuStep  []Resistance Exercise []Treadmill  []NuStep  []Resistance Exercise []Treadmill  []NuStep  []Resistance Exercise   Initial Workloads  NS: Load 4 @ 50-60SPM    RE: lower body exercises   Current Workloads  TM: @ %=  METs  AD:  level =  METs  NS:  lomeli=  METs  AE: level =  METs  RE: lbs, Reps, sets Current Workloads  TM: @ %=  METs  AD:  level =  METs  NS:  lomeli=  METs  AE:  =  METs  RE: lbs, Reps, sets final Workloads  TM: @ %=  METs  AD:  level =  METs  NS:  lomeli=  METs  AE:  =  METs  RE: lbs, Reps, sets   EXERCISE PROGRESSION EXERCISE PROGRESSION EXERCISE PROGRESSION EXERCISE PROGRESSION   If patient can NUSTEP for 8 min or more before experiencing 3 out of 4 pain, then load on NUSTEP will be increased by 1. If initial speed and grade of 10% is reached, speed increases by 0.1 mph. If patient can walk for 8 min or more before experiencing 3 out of 4 pain, then speed will be increased up to 3.0 mph. If pt can walk 3.0 and 10% for 8 minutes, then grade is increased by 1%. Grade is increased in this fashion up to 15%.   If pt can walk at 3/15% for 8 minutes, then speed is increased again by 0.1 mph. Pt progressed, final speed is noted above. Usama   [x] Yes    [] No  Type: walking  Frequency: 3d/wk  Duration: 5 min at a time [] Yes    [] No  Type:   Frequency:   Duration:  [] Yes    [] No  Type:   Frequency:  Duration:  [] Yes    [] No  Type:   Frequency:   Duration:    WIQ   WIQ   Score = **     (pt needs to complete)   Score = %   PAIN ASSESSMENT PAIN ASSESSMENT PAIN ASSESSMENT PAIN ASSESSMENT   Do you normally walk  [x] Yes    [] No   If no, why? Do you normally walk  [] Yes    [] No   If no, why? Do you develop discomfort in your legs when you walk? [x] Yes    [] No  -burning, aching    Do you develop discomfort in your legs when you walk? [] Yes    [] No  -cramping, aching fatigue   Do you get the same pain when you are sitting, standing, stooping or lying down? [] Yes    [x] No   Do you get the same pain when you are sitting, standing, stooping or lying down? [] Yes    [] No   Do symptoms only start when you walk? [x] Yes    [] No    Around 100ft   Do symptoms only start when you walk? [] Yes    [] No   Do symptoms ever go away while walking? [] Yes    [x] No  \"only way to get it to quit is to sit down\"   Do symptoms ever go away while walking? [] Yes    [] No   Does the discomfort always occur at about the same distance? [x] Yes    [] No  Around 100ft Lili Campos is able to walk ** now, before discomfort. Lili Capmos is able to walk ** now, before discomfort Lili Campos is able to walk ** now, before discomfort   Do symptoms resolve once you stop walking? [x] Yes    [] No  How long? Around 4 minutes   Do symptoms resolve once you stop walking? [] Yes    [] No  How long? Tell me what happens when you go for a walk. \"Aching and burning starts around 100ft and I have to sit down and rest for about 4 minutes\"   Tell me what happens when you go for a walk.      Rate pain with ADLs:  []0 No Pain  []1 Mild Pain  []2 Moderate Pain  []3 Intense Pain  [x]4 Unbearable Pain Rate pain with ADLs:  []0 No Pain  []1 Mild Pain  []2 Moderate Pain  []3 Intense Pain  []4 Unbearable Pain Rate pain with ADLs:  []0 No Pain  []1 Mild Pain  []2 Moderate Pain  []3 Intense Pain  []4 Unbearable Pain Rate pain with ADLs:  []0 No Pain  []1 Mild Pain  []2 Moderate Pain  []3 Intense Pain  []4 Unbearable Pain   PAD RISK FACTORS PAD RISK FACTORS PAD RISK FACTORS PAD RISK FACTORS   Tobacco Use Tobacco Use Tobacco Use Tobacco Use   [] Current  [] Former  [x] Never   Change in smoking status   [] Yes      [] No    Quit date:  Change in smoking status   [] Yes      [] No    Quit date: Change in smoking status   [] Yes      [] No    Quit date:    Diabetes Diabetes Diabetes Diabetes   [] Yes      [x] No  FBS: 107            Most Recent BS:  BS have been in range  [] Yes      [] No  Medication Changes  [] Yes      [] No Most Recent BS:  BS have been in range  [] Yes      [] No  Medication Changes  [] Yes      [] No Most Recent BS:  BS have been in range  [] Yes      [] No  Medication Changes  [] Yes      [] No   Hypertension Hypertension Hypertension Hypertension   [x] Yes      [] No    Medication: LISINOPRIL and HYTRIN Medication Changes:  [] Yes      [] No Medication Changes:  [] Yes      [] No Medication Changes:  [] Yes      [] No   HLD/DLD HLD/DLD HLD/DLD HLD/DLD   [x] Yes      [] No  TOTAL CHOL: 117  HDL:  66  LDL:  37  TRI  Medication:  LIPITOR Medication Changes:  [] Yes      [] No Medication Changes:  [] Yes      [] No Medication Changes:  [] Yes      [] No   Heart Disease      Patient history of:  []PCI w/Stent      []OHS-CABG or Valve      []MI      []CAD      []Stroke      []Other:       EDUCATION      Reviewed on Evaluation?   PAD Pain Scale  [x]Yes      [] No  Exercise Safety  [x]Yes      [] No  Equipment Orientation  [x]Yes      [] No  S/S to Report  [x]Yes      [] No  Warm Up/Cool Down  [x]Yes      [] No  Home Exercise  [x]Yes      [] No      PHYSICIAN INTERACTION    Initial Assessment Review PHYSICIAN INTERACTION    30 day Assessment & ITP Review: PHYSICIAN INTERACTION    60 day Assessment & ITP Review: PHYSICIAN INTERACTION    Final Assessment & Discharge Review:   []Initial  Assessment completed     []Reviewed  Treatment Plan and Goals support patient needs/ abilities    Cosigned and dated below: []I agree with the plan  Continue with progression and instruction    []Continue, but with the following changes:      Cosigned and dated below: []I agree with the plan  Continue with progression and instruction    []Continue, but with the following changes:    Cosigned and dated below: []Discharge patient                Cosigned and dated below:

## 2020-07-23 ENCOUNTER — HOSPITAL ENCOUNTER (OUTPATIENT)
Dept: CARDIAC REHAB | Age: 70
Setting detail: THERAPIES SERIES
Discharge: HOME | End: 2020-07-23
Payer: MEDICARE

## 2020-07-23 PROCEDURE — 93668 PERIPHERAL VASCULAR REHAB: CPT

## 2020-07-24 ENCOUNTER — HOSPITAL ENCOUNTER (OUTPATIENT)
Dept: CARDIAC REHAB | Age: 70
Setting detail: THERAPIES SERIES
Discharge: HOME | End: 2020-07-24
Payer: MEDICARE

## 2020-07-24 PROCEDURE — 93668 PERIPHERAL VASCULAR REHAB: CPT

## 2020-07-28 ENCOUNTER — HOSPITAL ENCOUNTER (OUTPATIENT)
Dept: CARDIAC REHAB | Age: 70
Setting detail: THERAPIES SERIES
Discharge: HOME | End: 2020-07-28
Payer: MEDICARE

## 2020-07-28 PROCEDURE — 93668 PERIPHERAL VASCULAR REHAB: CPT

## 2020-07-30 ENCOUNTER — HOSPITAL ENCOUNTER (OUTPATIENT)
Dept: CARDIAC REHAB | Age: 70
Setting detail: THERAPIES SERIES
End: 2020-07-30
Payer: MEDICARE

## 2020-07-30 ENCOUNTER — HOSPITAL ENCOUNTER (OUTPATIENT)
Dept: CARDIAC REHAB | Age: 70
Setting detail: THERAPIES SERIES
Discharge: HOME | End: 2020-07-30
Payer: MEDICARE

## 2020-07-30 PROCEDURE — 93668 PERIPHERAL VASCULAR REHAB: CPT

## 2020-07-31 ENCOUNTER — HOSPITAL ENCOUNTER (OUTPATIENT)
Dept: CARDIAC REHAB | Age: 70
Setting detail: THERAPIES SERIES
Discharge: HOME | End: 2020-07-31
Payer: MEDICARE

## 2020-07-31 PROCEDURE — 93668 PERIPHERAL VASCULAR REHAB: CPT

## 2020-08-04 ENCOUNTER — APPOINTMENT (OUTPATIENT)
Dept: CARDIAC REHAB | Age: 70
End: 2020-08-04
Payer: MEDICARE

## 2020-08-04 ENCOUNTER — HOSPITAL ENCOUNTER (OUTPATIENT)
Dept: CARDIAC REHAB | Age: 70
Setting detail: THERAPIES SERIES
Discharge: HOME | End: 2020-08-04
Payer: MEDICARE

## 2020-08-04 PROCEDURE — 93668 PERIPHERAL VASCULAR REHAB: CPT

## 2020-08-06 ENCOUNTER — APPOINTMENT (OUTPATIENT)
Dept: CARDIAC REHAB | Age: 70
End: 2020-08-06
Payer: MEDICARE

## 2020-08-06 ENCOUNTER — HOSPITAL ENCOUNTER (OUTPATIENT)
Dept: CARDIAC REHAB | Age: 70
Setting detail: THERAPIES SERIES
Discharge: HOME | End: 2020-08-06
Payer: MEDICARE

## 2020-08-06 PROCEDURE — 93668 PERIPHERAL VASCULAR REHAB: CPT

## 2020-08-07 ENCOUNTER — HOSPITAL ENCOUNTER (OUTPATIENT)
Dept: CARDIAC REHAB | Age: 70
Setting detail: THERAPIES SERIES
Discharge: HOME | End: 2020-08-07
Payer: MEDICARE

## 2020-08-11 ENCOUNTER — HOSPITAL ENCOUNTER (OUTPATIENT)
Dept: CARDIAC REHAB | Age: 70
Setting detail: THERAPIES SERIES
Discharge: HOME | End: 2020-08-11
Payer: MEDICARE

## 2020-08-11 ENCOUNTER — APPOINTMENT (OUTPATIENT)
Dept: CARDIAC REHAB | Age: 70
End: 2020-08-11
Payer: MEDICARE

## 2020-08-11 PROCEDURE — 93668 PERIPHERAL VASCULAR REHAB: CPT

## 2020-08-12 ENCOUNTER — HOSPITAL ENCOUNTER (OUTPATIENT)
Dept: CARDIAC REHAB | Age: 70
Setting detail: THERAPIES SERIES
Discharge: HOME | End: 2020-08-12
Payer: MEDICARE

## 2020-08-12 PROCEDURE — 93668 PERIPHERAL VASCULAR REHAB: CPT

## 2020-08-13 ENCOUNTER — HOSPITAL ENCOUNTER (OUTPATIENT)
Dept: CARDIAC REHAB | Age: 70
Setting detail: THERAPIES SERIES
Discharge: HOME | End: 2020-08-13
Payer: MEDICARE

## 2020-08-13 ENCOUNTER — APPOINTMENT (OUTPATIENT)
Dept: CARDIAC REHAB | Age: 70
End: 2020-08-13
Payer: MEDICARE

## 2020-08-13 PROCEDURE — 93668 PERIPHERAL VASCULAR REHAB: CPT

## 2020-08-14 ENCOUNTER — HOSPITAL ENCOUNTER (OUTPATIENT)
Dept: CARDIAC REHAB | Age: 70
Setting detail: THERAPIES SERIES
End: 2020-08-14
Payer: MEDICARE

## 2020-08-18 ENCOUNTER — APPOINTMENT (OUTPATIENT)
Dept: CARDIAC REHAB | Age: 70
End: 2020-08-18
Payer: MEDICARE

## 2020-08-19 ENCOUNTER — HOSPITAL ENCOUNTER (OUTPATIENT)
Dept: CARDIAC REHAB | Age: 70
Setting detail: THERAPIES SERIES
Discharge: HOME | End: 2020-08-19
Payer: MEDICARE

## 2020-08-19 PROCEDURE — 93668 PERIPHERAL VASCULAR REHAB: CPT

## 2020-08-19 NOTE — PROGRESS NOTES
[address] [facility]    Patient Name:  Fan Castro  :  1950  Age:  77 y.o. MRN:  [id number]    Physician:  Franki Swenson  Next Office Visit:  20  Allergies: No Known Allergies          INITIAL  ASSESSEMENT 30 DAY  ASSESSMENT 60 DAY  ASSESSMENT FINAL  ASSESSMENT   DATE: 2020 DATE: 2020  DATE: DATE:   SESSION #: 1 SESSIONS #: 13 SESSIONS #: SESSIONS #:      Last session completed on:         EXERCISE ASSESSMENT EXERCISE ASSESSMENT EXERCISE ASSESSMENT EXERCISE ASSESSMENT   NUSTEP Exercise Test   Isidro Exercise Test   Completed:  [x] Yes    [] No   Completed:  [] Yes    [] No   Pain Free NUSTEO distance= 287 sec    Total NUSTEP Time = 350sec   Pain Free walking distance =  sec      Total Walking Time =   sec         Starting NUSTEP level on test = Load 1    Ending NS level on test =LOAD3  Current NS level = load 10 60-70 SPM   Current TM Levels =   mph @ %   Ending TM levels =   mph @ %   Vitals Vitals Vitals Vitals   Resting HR =  90  Max HR = 96  Heart Rhythm: NSR  CASSY= R: 0.83 L:1.09  Resting Blood Pressure = 132/76 Resting HR = 74        Max HR = 109      Resting Blood Pressure = 120/60 Resting HR =        Max HR =     Systolic Arm =  Systolic Leg =  CASSY: Leg/Arm =    Resting Blood Pressure =  Resting HR =        Max HR =    Systolic Arm =  Systolic Leg =  CASSY: Leg/Arm =    Resting Blood Pressure =   Primary Exercise Goals Primary Exercise Goals Primary Exercise Goals Primary Exercise Goals   *Reduce overall ambulatory claudication pain  *Increase pain free walking distance. Is patient making progress towards goal?  [x] Yes    [] No  Explain: pt reports that he is able to walk further distances, do chores, and climb stairs for longer periods of time with less pain.  Is patient making progress towards goal?  [] Yes    [] No  Explain: Patient achieved goal?  [] Yes    [] No  Explain:   Secondary Exercise Goals for about 4 minutes\"   Tell me what happens when you go for a walk. Rate pain with ADLs:  []0 No Pain  []1 Mild Pain  []2 Moderate Pain  []3 Intense Pain  [x]4 Unbearable Pain Rate pain with ADLs:  []0 No Pain  []1 Mild Pain  []2 Moderate Pain  []3 Intense Pain  [x]4 Unbearable Pain Rate pain with ADLs:  []0 No Pain  []1 Mild Pain  []2 Moderate Pain  []3 Intense Pain  []4 Unbearable Pain Rate pain with ADLs:  []0 No Pain  []1 Mild Pain  []2 Moderate Pain  []3 Intense Pain  []4 Unbearable Pain   PAD RISK FACTORS PAD RISK FACTORS PAD RISK FACTORS PAD RISK FACTORS   Tobacco Use Tobacco Use Tobacco Use Tobacco Use   [] Current  [] Former  [x] Never   Change in smoking status   [] Yes      [x] No      Change in smoking status   [] Yes      [] No    Quit date: Change in smoking status   [] Yes      [] No    Quit date:    Diabetes Diabetes Diabetes Diabetes   [] Yes      [x] No  FBS: 107             Most Recent BS:  BS have been in range  [] Yes      [] No  Medication Changes  [] Yes      [] No Most Recent BS:  BS have been in range  [] Yes      [] No  Medication Changes  [] Yes      [] No   Hypertension Hypertension Hypertension Hypertension   [x] Yes      [] No    Medication: LISINOPRIL and HYTRIN Medication Changes:  [] Yes      [x] No Medication Changes:  [] Yes      [] No Medication Changes:  [] Yes      [] No   HLD/DLD HLD/DLD HLD/DLD HLD/DLD   [x] Yes      [] No  TOTAL CHOL: 117  HDL:  66  LDL:  37  TRI  Medication:  LIPITOR Medication Changes:  [] Yes      [x] No Medication Changes:  [] Yes      [] No Medication Changes:  [] Yes      [] No   Heart Disease      Patient history of:  []PCI w/Stent      []OHS-CABG or Valve      []MI      []CAD      []Stroke      []Other:       EDUCATION      Reviewed on Evaluation?   PAD Pain Scale  [x]Yes      [] No  Exercise Safety  [x]Yes      [] No  Equipment Orientation  [x]Yes      [] No  S/S to Report  [x]Yes      [] No  Warm Up/Cool Down  [x]Yes      [] No  Home Exercise  [x]Yes      [] No      PHYSICIAN INTERACTION    Initial Assessment Review PHYSICIAN INTERACTION    30 day Assessment & ITP Review: PHYSICIAN INTERACTION    60 day Assessment & ITP Review: PHYSICIAN INTERACTION    Final Assessment & Discharge Review:   []Initial  Assessment completed     []Reviewed  Treatment Plan and Goals support patient needs/ abilities    Cosigned and dated below: []I agree with the plan  Continue with progression and instruction    []Continue, but with the following changes:      Cosigned and dated below: []I agree with the plan  Continue with progression and instruction    []Continue, but with the following changes:    Cosigned and dated below: []Discharge patient                Cosigned and dated below:     Cosigned by:  Wiliam Tong MD at 7/21/2020  4:26 PM    Revision History     Date/Time  User  Provider Type  Action    7/21/2020  4:26 PM  Wiliam Tong MD  Physician  Cosign    7/21/2020  3:45 PM  Cameron Mattson  Exercise Physiologist  Sign

## 2020-08-20 ENCOUNTER — APPOINTMENT (OUTPATIENT)
Dept: CARDIAC REHAB | Age: 70
End: 2020-08-20
Payer: MEDICARE

## 2020-08-20 ENCOUNTER — HOSPITAL ENCOUNTER (OUTPATIENT)
Dept: CARDIAC REHAB | Age: 70
Setting detail: THERAPIES SERIES
Discharge: HOME | End: 2020-08-20
Payer: MEDICARE

## 2020-08-21 ENCOUNTER — HOSPITAL ENCOUNTER (OUTPATIENT)
Dept: CARDIAC REHAB | Age: 70
Setting detail: THERAPIES SERIES
Discharge: HOME | End: 2020-08-21
Payer: MEDICARE

## 2020-08-21 PROCEDURE — 93668 PERIPHERAL VASCULAR REHAB: CPT

## 2020-08-25 ENCOUNTER — HOSPITAL ENCOUNTER (OUTPATIENT)
Dept: CARDIAC REHAB | Age: 70
Setting detail: THERAPIES SERIES
Discharge: HOME | End: 2020-08-25
Payer: MEDICARE

## 2020-08-25 ENCOUNTER — APPOINTMENT (OUTPATIENT)
Dept: CARDIAC REHAB | Age: 70
End: 2020-08-25
Payer: MEDICARE

## 2020-08-25 PROCEDURE — 93668 PERIPHERAL VASCULAR REHAB: CPT

## 2020-08-27 ENCOUNTER — HOSPITAL ENCOUNTER (OUTPATIENT)
Dept: CARDIAC REHAB | Age: 70
Setting detail: THERAPIES SERIES
Discharge: HOME | End: 2020-08-27
Payer: MEDICARE

## 2020-08-27 ENCOUNTER — APPOINTMENT (OUTPATIENT)
Dept: CARDIAC REHAB | Age: 70
End: 2020-08-27
Payer: MEDICARE

## 2020-08-27 PROCEDURE — 93668 PERIPHERAL VASCULAR REHAB: CPT

## 2020-08-28 ENCOUNTER — HOSPITAL ENCOUNTER (OUTPATIENT)
Dept: CARDIAC REHAB | Age: 70
Setting detail: THERAPIES SERIES
Discharge: HOME | End: 2020-08-28
Payer: MEDICARE

## 2020-08-28 PROCEDURE — 93668 PERIPHERAL VASCULAR REHAB: CPT

## 2020-09-01 ENCOUNTER — HOSPITAL ENCOUNTER (OUTPATIENT)
Dept: CARDIAC REHAB | Age: 70
Setting detail: THERAPIES SERIES
Discharge: HOME | End: 2020-09-01
Payer: MEDICARE

## 2020-09-01 ENCOUNTER — APPOINTMENT (OUTPATIENT)
Dept: CARDIAC REHAB | Age: 70
End: 2020-09-01
Payer: MEDICARE

## 2020-09-01 PROCEDURE — 93668 PERIPHERAL VASCULAR REHAB: CPT

## 2020-09-03 ENCOUNTER — APPOINTMENT (OUTPATIENT)
Dept: CARDIAC REHAB | Age: 70
End: 2020-09-03
Payer: MEDICARE

## 2020-09-03 ENCOUNTER — HOSPITAL ENCOUNTER (OUTPATIENT)
Dept: CARDIAC REHAB | Age: 70
Setting detail: THERAPIES SERIES
Discharge: HOME | End: 2020-09-03
Payer: MEDICARE

## 2020-09-03 PROCEDURE — 93668 PERIPHERAL VASCULAR REHAB: CPT

## 2020-09-04 ENCOUNTER — HOSPITAL ENCOUNTER (OUTPATIENT)
Dept: CARDIAC REHAB | Age: 70
Setting detail: THERAPIES SERIES
Discharge: HOME | End: 2020-09-04
Payer: MEDICARE

## 2020-09-04 PROCEDURE — 93668 PERIPHERAL VASCULAR REHAB: CPT

## 2020-09-08 ENCOUNTER — APPOINTMENT (OUTPATIENT)
Dept: CARDIAC REHAB | Age: 70
End: 2020-09-08
Payer: MEDICARE

## 2020-09-08 ENCOUNTER — HOSPITAL ENCOUNTER (OUTPATIENT)
Dept: CARDIAC REHAB | Age: 70
Setting detail: THERAPIES SERIES
End: 2020-09-08
Payer: MEDICARE

## 2020-09-10 ENCOUNTER — HOSPITAL ENCOUNTER (OUTPATIENT)
Dept: CARDIAC REHAB | Age: 70
Setting detail: THERAPIES SERIES
Discharge: HOME | End: 2020-09-10
Payer: MEDICARE

## 2020-09-10 ENCOUNTER — APPOINTMENT (OUTPATIENT)
Dept: CARDIAC REHAB | Age: 70
End: 2020-09-10
Payer: MEDICARE

## 2020-09-11 ENCOUNTER — HOSPITAL ENCOUNTER (OUTPATIENT)
Dept: CARDIAC REHAB | Age: 70
Setting detail: THERAPIES SERIES
Discharge: HOME | End: 2020-09-11
Payer: MEDICARE

## 2020-09-11 PROCEDURE — 93668 PERIPHERAL VASCULAR REHAB: CPT

## 2020-09-15 ENCOUNTER — APPOINTMENT (OUTPATIENT)
Dept: CARDIAC REHAB | Age: 70
End: 2020-09-15
Payer: MEDICARE

## 2020-09-15 ENCOUNTER — HOSPITAL ENCOUNTER (OUTPATIENT)
Dept: CARDIAC REHAB | Age: 70
Setting detail: THERAPIES SERIES
Discharge: HOME | End: 2020-09-15
Payer: MEDICARE

## 2020-09-15 PROCEDURE — 93668 PERIPHERAL VASCULAR REHAB: CPT

## 2020-09-15 NOTE — PROGRESS NOTES
[facility]    Patient Name:  Kyle Medina  :  1950  Age:  77 y.o. MRN:  [id number]    Physician:  Yesi Goodson  Next Office Visit:  20  Allergies: No Known Allergies          INITIAL  ASSESSEMENT 30 DAY  ASSESSMENT 60 DAY  ASSESSMENT FINAL  ASSESSMENT   DATE: 2020 DATE: 2020  DATE: 9/15/2020 DATE:   SESSION #: 1 SESSIONS #: 13 SESSIONS #: 23 SESSIONS #:      Last session completed on:         EXERCISE ASSESSMENT EXERCISE ASSESSMENT EXERCISE ASSESSMENT EXERCISE ASSESSMENT   NUSTEP Exercise Test   Isidro Exercise Test   Completed:  [x] Yes    [] No   Completed:  [] Yes    [] No   Pain Free NUSTEO distance= 287 sec    Total NUSTEP Time = 350sec   Pain Free walking distance =  sec      Total Walking Time =   sec         Starting NUSTEP level on test = Load 1    Ending NS level on test =LOAD3  Current NS level = load 10 60-70 SPM   Current NS Levels =   Load 10, 70-80SPM Ending TM levels =   mph @ %   Vitals Vitals Vitals Vitals   Resting HR =  90  Max HR = 96  Heart Rhythm: NSR  CASSY= R: 0.83 L:1.09  Resting Blood Pressure = 132/76 Resting HR = 74        Max HR = 109      Resting Blood Pressure = 120/60 Resting HR = 68       Max HR =112      Resting Blood Pressure =108/60  Resting HR =        Max HR =    Systolic Arm =  Systolic Leg =  CASSY: Leg/Arm =    Resting Blood Pressure =   Primary Exercise Goals Primary Exercise Goals Primary Exercise Goals Primary Exercise Goals   *Reduce overall ambulatory claudication pain  *Increase pain free walking distance. Is patient making progress towards goal?  [x] Yes    [] No  Explain: pt reports that he is able to walk further distances, do chores, and climb stairs for longer periods of time with less pain. Is patient making progress towards goal?  [x] Yes    [] No  Explain: patient is able to walk further distances and able to do more around the house.   Patient achieved goal?  [] Yes    [] No  Explain:   Secondary Exercise Goals Secondary Exercise Goals Secondary Exercise Goals Secondary Exercise Goals   *Increase strength and endurance so Patricia Brady is able to walk longer distances without having to stop due to pain Is Patricia Brady making progress towards goal?  [x] Yes    [] No  Explain: his walking pain has decreased and he is able to walk longer without taking as many breaks. Is Patricia Brady making progress towards goal?  [x] Yes    [] No  Explain: walking pain has decreased, able to walk longer [place] R achieved goal?  [] Yes    [] No  Explain:   EXERCISE PRESCRIPTION EXERCISE PRESCRIPTION EXERCISE PRESCRIPTION EXERCISE PRESCRIPTION   F.I.T.T. F.I.T.T. F.I.T.T. F.I.T.T. Frequency:  3x/wk Frequency:  3x/wk Frequency:  3x/wk Frequency:  3x/wk   Intensity: Up to 3 out of 4 pain Intensity:  Up to 3 out of 4 pain Intensity:  Up to 3 out of 4 pain Intensity:  Up to 3 out of 4 pain   Time:   20-35 total minutes on NS Time:  min/mode  30-45 total minutes on TM Time:  min/mode  30-45 total minutes on TM Time:  min/mode  30-45 total minutes on TM   Type:   Type:   Type:   Type:     []Treadmill  [x]NuStep  [x]Resistance Exercise   [x]NuStep  [x]Resistance Exercise []Treadmill  [x]NuStep  [x]Resistance Exercise []Treadmill  []NuStep  []Resistance Exercise   Initial Workloads  NS: Load 4 @ 50-60SPM    RE: lower body exercises   Current Workloads  NS:  Load 10 60-70 SPM    RE: lower body exercises 15 reps   NS:  70-80SPM, Load 10  RE: lower body exercises, 15 Reps, 1 set final Workloads  TM: @ %=  METs  AD:  level =  METs  NS:  lomeli=  METs  AE:  =  METs  RE: lbs, Reps, sets   EXERCISE PROGRESSION EXERCISE PROGRESSION EXERCISE PROGRESSION EXERCISE PROGRESSION   If patient can NUSTEP for 8 min or more before experiencing 3 out of 4 pain, then load on NUSTEP will be increased by 1.      Increase SPM on NUSTEP if pt reaches 8 min on load 10 60-70SPM If pt can walk 3.0 and 10% for 8 minutes, then grade is increased by 1%. Grade is increased in this fashion up to 15%. If pt can walk at 3/15% for 8 minutes, then speed is increased again by 0.1 mph. Pt progressed, final speed is noted above. Usama   [x] Yes    [] No  Type: walking  Frequency: 3d/wk  Duration: 5 min at a time [x] Yes    [] No  Type: walking and lower body resistance exercises  Frequency: daily   Duration: 10-15 min [x] Yes    [] No  Type: walking, lower body resistance exercises  Frequency: daily  Duration: 15mins [] Yes    [] No  Type:   Frequency:   Duration:    WIQ   WIQ   Score = **     (pt needs to complete)   Score = %   PAIN ASSESSMENT PAIN ASSESSMENT PAIN ASSESSMENT PAIN ASSESSMENT   Do you normally walk  [x] Yes    [] No   If no, why? Do you normally walk  [] Yes    [] No   If no, why? Do you develop discomfort in your legs when you walk? [x] Yes    [] No  -burning, aching    Do you develop discomfort in your legs when you walk? [] Yes    [] No  -cramping, aching fatigue   Do you get the same pain when you are sitting, standing, stooping or lying down? [] Yes    [x] No   Do you get the same pain when you are sitting, standing, stooping or lying down? [] Yes    [] No   Do symptoms only start when you walk? [x] Yes    [] No    Around 100ft   Do symptoms only start when you walk? [] Yes    [] No   Do symptoms ever go away while walking? [] Yes    [x] No  \"only way to get it to quit is to sit down\"   Do symptoms ever go away while walking? [] Yes    [] No   Does the discomfort always occur at about the same distance? [x] Yes    [] No  Around 100ft Angela Box is able to walk 8 minutes around his yard before discomfort  Nomoni Box is able to walk around house and yard without discomfort Nomoni Box is able to walk ** now, before discomfort   Do symptoms resolve once you stop walking? [x] Yes    [] No  How long? Around 4 minutes   Do symptoms resolve once you stop walking?   [] Yes    [] No  How long? Tell me what happens when you go for a walk. \"Aching and burning starts around 100ft and I have to sit down and rest for about 4 minutes\"   Tell me what happens when you go for a walk. Rate pain with ADLs:  []0 No Pain  []1 Mild Pain  []2 Moderate Pain  []3 Intense Pain  [x]4 Unbearable Pain Rate pain with ADLs:  []0 No Pain  []1 Mild Pain  []2 Moderate Pain  []3 Intense Pain  [x]4 Unbearable Pain Rate pain with ADLs:  []0 No Pain  []1 Mild Pain  []2 Moderate Pain  []3 Intense Pain  [x]4 Unbearable Pain Rate pain with ADLs:  []0 No Pain  []1 Mild Pain  []2 Moderate Pain  []3 Intense Pain  []4 Unbearable Pain   PAD RISK FACTORS PAD RISK FACTORS PAD RISK FACTORS PAD RISK FACTORS   Tobacco Use Tobacco Use Tobacco Use Tobacco Use   [] Current  [] Former  [x] Never   Change in smoking status   [] Yes      [x] No      Change in smoking status   [] Yes      [x] No     Change in smoking status   [] Yes      [] No    Quit date:    Diabetes Diabetes Diabetes Diabetes   [] Yes      [x] No  FBS: 107             NA Most Recent BS:  BS have been in range  [] Yes      [] No  Medication Changes  [] Yes      [] No   Hypertension Hypertension Hypertension Hypertension   [x] Yes      [] No    Medication: LISINOPRIL and HYTRIN Medication Changes:  [] Yes      [x] No Medication Changes:  [] Yes      [x] No Medication Changes:  [] Yes      [] No   HLD/DLD HLD/DLD HLD/DLD HLD/DLD   [x] Yes      [] No  TOTAL CHOL: 117  HDL:  66  LDL:  37  TRI  Medication:  LIPITOR Medication Changes:  [] Yes      [x] No Medication Changes:  [] Yes      [x] No Medication Changes:  [] Yes      [] No   Heart Disease      Patient history of:  []PCI w/Stent      []OHS-CABG or Valve      []MI      []CAD      []Stroke      []Other:       EDUCATION      Reviewed on Evaluation?   PAD Pain Scale  [x]Yes      [] No  Exercise Safety  [x]Yes      [] No  Equipment Orientation  [x]Yes      [] No  S/S to Report  [x]Yes      [] No  Warm Up/Cool Down  [x]Yes      [] No  Home Exercise  [x]Yes      [] No      PHYSICIAN INTERACTION    Initial Assessment Review PHYSICIAN INTERACTION    30 day Assessment & ITP Review: PHYSICIAN INTERACTION    60 day Assessment & ITP Review: PHYSICIAN INTERACTION    Final Assessment & Discharge Review:   []Initial  Assessment completed     []Reviewed  Treatment Plan and Goals support patient needs/ abilities    Cosigned and dated below: []I agree with the plan  Continue with progression and instruction    []Continue, but with the following changes:      Cosigned and dated below: []I agree with the plan  Continue with progression and instruction    []Continue, but with the following changes:    Cosigned and dated below: []Discharge patient                Cosigned and dated below:     Cosigned by:  Tereso Liu MD at 7/21/2020  4:26 PM    Revision History     Date/Time  User  Provider Type  Action    7/21/2020  4:26 PM  Tereso Liu MD  Physician  Cosign    7/21/2020  3:45 PM  [facility]  Sign       Date/Time  User  Provider Type  Action    8/20/2020  1:14 PM  Tereso Liu MD  Physician  Cosign    8/19/2020  2:08 PM  Coni Parra  Exercise Physiologist  Sign

## 2020-09-17 ENCOUNTER — APPOINTMENT (OUTPATIENT)
Dept: CARDIAC REHAB | Age: 70
End: 2020-09-17
Payer: MEDICARE

## 2020-09-17 ENCOUNTER — HOSPITAL ENCOUNTER (OUTPATIENT)
Dept: CARDIAC REHAB | Age: 70
Setting detail: THERAPIES SERIES
Discharge: HOME | End: 2020-09-17
Payer: MEDICARE

## 2020-09-17 PROCEDURE — 93668 PERIPHERAL VASCULAR REHAB: CPT

## 2020-09-17 NOTE — TELEPHONE ENCOUNTER
Laney Vargas called requesting a refill on the following medications:  Requested Prescriptions     Pending Prescriptions Disp Refills    bisoprolol (ZEBETA) 5 MG tablet 30 tablet      Sig: Take 1 tablet by mouth daily    clopidogrel (PLAVIX) 75 MG tablet 30 tablet      Sig: Take 1 tablet by mouth daily    ezetimibe (ZETIA) 10 MG tablet 30 tablet      Sig: Take 1 tablet by mouth daily     Pharmacy verified: [facility]  . [email or web address]      Date of last visit:   Date of next visit (if applicable): 76/49/8730

## 2020-09-18 ENCOUNTER — HOSPITAL ENCOUNTER (OUTPATIENT)
Dept: CARDIAC REHAB | Age: 70
Setting detail: THERAPIES SERIES
Discharge: HOME | End: 2020-09-18
Payer: MEDICARE

## 2020-09-18 PROCEDURE — 93668 PERIPHERAL VASCULAR REHAB: CPT

## 2020-09-18 RX ORDER — CLOPIDOGREL BISULFATE 75 MG/1
75 TABLET ORAL DAILY
Qty: 30 TABLET | Refills: 1 | Status: SHIPPED | OUTPATIENT
Start: 2020-09-18 | End: 2020-10-19 | Stop reason: SDUPTHER

## 2020-09-18 RX ORDER — EZETIMIBE 10 MG/1
10 TABLET ORAL DAILY
Qty: 30 TABLET | Refills: 1 | Status: SHIPPED | OUTPATIENT
Start: 2020-09-18 | End: 2020-10-19 | Stop reason: SDUPTHER

## 2020-09-18 RX ORDER — BISOPROLOL FUMARATE 5 MG/1
5 TABLET ORAL DAILY
Qty: 30 TABLET | Refills: 1 | Status: SHIPPED | OUTPATIENT
Start: 2020-09-18 | End: 2020-10-19 | Stop reason: SDUPTHER

## 2020-09-22 ENCOUNTER — HOSPITAL ENCOUNTER (OUTPATIENT)
Dept: CARDIAC REHAB | Age: 70
Setting detail: THERAPIES SERIES
Discharge: HOME | End: 2020-09-22
Payer: MEDICARE

## 2020-09-22 ENCOUNTER — APPOINTMENT (OUTPATIENT)
Dept: CARDIAC REHAB | Age: 70
End: 2020-09-22
Payer: MEDICARE

## 2020-09-22 PROCEDURE — 93668 PERIPHERAL VASCULAR REHAB: CPT

## 2020-09-24 ENCOUNTER — APPOINTMENT (OUTPATIENT)
Dept: CARDIAC REHAB | Age: 70
End: 2020-09-24
Payer: MEDICARE

## 2020-09-24 ENCOUNTER — HOSPITAL ENCOUNTER (OUTPATIENT)
Dept: CARDIAC REHAB | Age: 70
Setting detail: THERAPIES SERIES
Discharge: HOME | End: 2020-09-24
Payer: MEDICARE

## 2020-09-24 PROCEDURE — 93668 PERIPHERAL VASCULAR REHAB: CPT

## 2020-09-25 ENCOUNTER — HOSPITAL ENCOUNTER (OUTPATIENT)
Dept: CARDIAC REHAB | Age: 70
Setting detail: THERAPIES SERIES
Discharge: HOME | End: 2020-09-25
Payer: MEDICARE

## 2020-09-25 PROCEDURE — 93668 PERIPHERAL VASCULAR REHAB: CPT

## 2020-09-29 ENCOUNTER — APPOINTMENT (OUTPATIENT)
Dept: CARDIAC REHAB | Age: 70
End: 2020-09-29
Payer: MEDICARE

## 2020-09-29 ENCOUNTER — HOSPITAL ENCOUNTER (OUTPATIENT)
Dept: CARDIAC REHAB | Age: 70
Setting detail: THERAPIES SERIES
Discharge: HOME | End: 2020-09-29
Payer: MEDICARE

## 2020-09-29 PROCEDURE — 93668 PERIPHERAL VASCULAR REHAB: CPT

## 2020-10-01 ENCOUNTER — APPOINTMENT (OUTPATIENT)
Dept: CARDIAC REHAB | Age: 70
End: 2020-10-01
Payer: MEDICARE

## 2020-10-01 ENCOUNTER — HOSPITAL ENCOUNTER (OUTPATIENT)
Dept: CARDIAC REHAB | Age: 70
Setting detail: THERAPIES SERIES
Discharge: HOME | End: 2020-10-01
Payer: MEDICARE

## 2020-10-01 PROCEDURE — 93668 PERIPHERAL VASCULAR REHAB: CPT

## 2020-10-02 ENCOUNTER — HOSPITAL ENCOUNTER (OUTPATIENT)
Dept: CARDIAC REHAB | Age: 70
Setting detail: THERAPIES SERIES
Discharge: HOME | End: 2020-10-02
Payer: MEDICARE

## 2020-10-02 PROCEDURE — 93668 PERIPHERAL VASCULAR REHAB: CPT

## 2020-10-06 ENCOUNTER — HOSPITAL ENCOUNTER (OUTPATIENT)
Dept: CARDIAC REHAB | Age: 70
Setting detail: THERAPIES SERIES
Discharge: HOME | End: 2020-10-06
Payer: MEDICARE

## 2020-10-06 ENCOUNTER — APPOINTMENT (OUTPATIENT)
Dept: CARDIAC REHAB | Age: 70
End: 2020-10-06
Payer: MEDICARE

## 2020-10-06 PROCEDURE — 93668 PERIPHERAL VASCULAR REHAB: CPT

## 2020-10-08 ENCOUNTER — APPOINTMENT (OUTPATIENT)
Dept: CARDIAC REHAB | Age: 70
End: 2020-10-08
Payer: MEDICARE

## 2020-10-08 ENCOUNTER — HOSPITAL ENCOUNTER (OUTPATIENT)
Dept: CARDIAC REHAB | Age: 70
Setting detail: THERAPIES SERIES
Discharge: HOME | End: 2020-10-08
Payer: MEDICARE

## 2020-10-08 PROCEDURE — 93668 PERIPHERAL VASCULAR REHAB: CPT

## 2020-10-09 ENCOUNTER — HOSPITAL ENCOUNTER (OUTPATIENT)
Dept: CARDIAC REHAB | Age: 70
Setting detail: THERAPIES SERIES
Discharge: HOME | End: 2020-10-09
Payer: MEDICARE

## 2020-10-09 PROCEDURE — 93668 PERIPHERAL VASCULAR REHAB: CPT

## 2020-10-13 ENCOUNTER — APPOINTMENT (OUTPATIENT)
Dept: CARDIAC REHAB | Age: 70
End: 2020-10-13
Payer: MEDICARE

## 2020-10-13 ENCOUNTER — HOSPITAL ENCOUNTER (OUTPATIENT)
Dept: CARDIAC REHAB | Age: 70
Setting detail: THERAPIES SERIES
Discharge: HOME | End: 2020-10-13
Payer: MEDICARE

## 2020-10-13 PROCEDURE — 93668 PERIPHERAL VASCULAR REHAB: CPT

## 2020-10-13 NOTE — PROGRESS NOTES
[address] [facility]    Patient Name:  Irene Briceno  :  1950  Age:  77 y.o. MRN:  [id number]    Physician:  Monico Nageotte  Next Office Visit:  20  Allergies: No Known Allergies          INITIAL  ASSESSEMENT 30 DAY  ASSESSMENT 60 DAY  ASSESSMENT FINAL  ASSESSMENT   DATE: 2020 DATE: 2020  DATE: 9/15/2020 DATE: 10/13/2020   SESSION #: 1 SESSIONS #: 13 SESSIONS #: 23 SESSIONS #: 36      Last session completed on: 10/13/2020         EXERCISE ASSESSMENT EXERCISE ASSESSMENT EXERCISE ASSESSMENT EXERCISE ASSESSMENT   NUSTEP Exercise Test   Authur Abbot Test   Completed:  [x] Yes    [] No   Completed:  [x] Yes    [] No   Pain Free NUSTEO distance= 287 sec    Total NUSTEP Time = 350sec   Pain Free walking distance =  960sec      Total Walking Time = 1080  sec         Starting NUSTEP level on test = Load 1    Ending NS level on test =LOAD3  Current NS level = load 10 60-70 SPM   Current NS Levels =   Load 10, 70-80SPM Ending NS levels = load 9    Vitals Vitals Vitals Vitals   Resting HR =  90  Max HR = 96  Heart Rhythm: NSR  CASSY= R: 0.83 L:1.09  Resting Blood Pressure = 132/76 Resting HR = 74        Max HR = 109      Resting Blood Pressure = 120/60 Resting HR = 68       Max HR =112      Resting Blood Pressure =108/60  Resting HR = 65        Max HR = 023    Systolic Arm = 420  Systolic Leg = 580  CASSY: Leg/Arm = L=1.04 R= unable to obtain    Resting Blood Pressure = 132/64   Primary Exercise Goals Primary Exercise Goals Primary Exercise Goals Primary Exercise Goals   *Reduce overall ambulatory claudication pain  *Increase pain free walking distance. Is patient making progress towards goal?  [x] Yes    [] No  Explain: pt reports that he is able to walk further distances, do chores, and climb stairs for longer periods of time with less pain.  Is patient making progress towards goal?  [x] Yes    [] No  Explain: patient is able to walk further distances and able to do more around the house. Patient achieved goal?  [x] Yes    [] No  Explain: pt able to walk further distances without having to stop. No longer has to set chairs around his house or barn to take breaks at. He is able to complete ADLs without discomfort. Secondary Exercise Goals Secondary Exercise Goals Secondary Exercise Goals Secondary Exercise Goals   *Increase strength and endurance so Lynette Johnson is able to walk longer distances without having to stop due to pain Is Lynette Johnson making progress towards goal?  [x] Yes    [] No  Explain: his walking pain has decreased and he is able to walk longer without taking as many breaks. Is Lynette Johnson making progress towards goal?  [x] Yes    [] No  Explain: walking pain has decreased, able to walk longer [place] R achieved goal?  [x] Yes    [] No  Explain: walking pain has decreased and pt does not have to rest as long when he needs to take a break. EXERCISE PRESCRIPTION EXERCISE PRESCRIPTION EXERCISE PRESCRIPTION EXERCISE PRESCRIPTION   F.I.T.T. F.I.T.T. F.I.T.T. F.I.T.T. Frequency:  3x/wk Frequency:  3x/wk Frequency:  3x/wk Frequency:  3x/wk   Intensity: Up to 3 out of 4 pain Intensity:  Up to 3 out of 4 pain Intensity:  Up to 3 out of 4 pain Intensity:  Up to 3 out of 4 pain   Time:   20-35 total minutes on NS Time:  min/mode  30-45 total minutes on TM Time:  min/mode  30-45 total minutes on TM Time:  30min/mode  30-45 total minutes on TM   Type:   Type:   Type:   Type:     []Treadmill  [x]NuStep  [x]Resistance Exercise   [x]NuStep  [x]Resistance Exercise []Treadmill  [x]NuStep  [x]Resistance Exercise   [x]NuStep  [x]Resistance Exercise   Initial Workloads  NS: Load 4 @ 50-60SPM    RE: lower body exercises   Current Workloads  NS:  Load 10 60-70 SPM    RE: lower body exercises 15 reps   NS:  70-80SPM, Load 10  RE: lower body exercises, 15 Reps, 1 set final Workloads  NS:  70/80 SPM load 10     RE: 15 reps, 1 set.     EXERCISE PROGRESSION EXERCISE PROGRESSION EXERCISE PROGRESSION EXERCISE PROGRESSION   If patient can NUSTEP for 8 min or more before experiencing 3 out of 4 pain, then load on NUSTEP will be increased by 1. Increase SPM on NUSTEP if pt reaches 8 min on load 10 60-70SPM If pt can walk 3.0 and 10% for 8 minutes, then grade is increased by 1%. Grade is increased in this fashion up to 15%. If pt can walk at 3/15% for 8 minutes, then speed is increased again by 0.1 mph. Pt progressed, final speed is noted above. Usama   [x] Yes    [] No  Type: walking  Frequency: 3d/wk  Duration: 5 min at a time [x] Yes    [] No  Type: walking and lower body resistance exercises  Frequency: daily   Duration: 10-15 min [x] Yes    [] No  Type: walking, lower body resistance exercises  Frequency: daily  Duration: 15mins [x] Yes    [] No  Type: bike, nustep at [facility]  Frequency: 3xweek   Duration: 30 min    WIQ   WIQ   Score = **     (pt needs to complete)   Pt did not complete   PAIN ASSESSMENT PAIN ASSESSMENT PAIN ASSESSMENT PAIN ASSESSMENT   Do you normally walk  [x] Yes    [] No   If no, why? Do you normally walk  [x] Yes    [] No   If no, why? Do you develop discomfort in your legs when you walk? [x] Yes    [] No  -burning, aching    Do you develop discomfort in your legs when you walk? [x] Yes    [] No  -cramping, aching fatigue. Pt states that his pain is much less and is able to walk longer without having to stop. Do you get the same pain when you are sitting, standing, stooping or lying down? [] Yes    [x] No   Do you get the same pain when you are sitting, standing, stooping or lying down? [] Yes    [x] No   Do symptoms only start when you walk? [x] Yes    [] No    Around 100ft   Do symptoms only start when you walk? [x] Yes    [] No   Do symptoms ever go away while walking?   [] Yes    [x] No  \"only way to get it to quit is to sit down\"   Do symptoms ever go away while walking? [] Yes    [x] No   Does the discomfort always occur at about the same distance? [x] Yes    [] No  Around 100ft Elvis Serrato is able to walk 8 minutes around his yard before discomfort  Elivs Serrato is able to walk around house and yard without discomfort Elvis Serrato is able to walk at least 200ft now, before discomfort. He states he can walk from his house to his barn now without having to stop. He was not able to do that before he started exercise. Do symptoms resolve once you stop walking? [x] Yes    [] No  How long? Around 4 minutes   Do symptoms resolve once you stop walking? [x] Yes    [] No  How long? Around 3 minutes   Tell me what happens when you go for a walk. \"Aching and burning starts around 100ft and I have to sit down and rest for about 4 minutes\"   Tell me what happens when you go for a walk.   \"Aching and burning around 200ft and then I have to sit down for a few minutes\"     Rate pain with ADLs:  []0 No Pain  []1 Mild Pain  []2 Moderate Pain  []3 Intense Pain  [x]4 Unbearable Pain Rate pain with ADLs:  []0 No Pain  []1 Mild Pain  []2 Moderate Pain  []3 Intense Pain  [x]4 Unbearable Pain Rate pain with ADLs:  []0 No Pain  []1 Mild Pain  []2 Moderate Pain  []3 Intense Pain  [x]4 Unbearable Pain Rate pain with ADLs:  []0 No Pain  []1 Mild Pain  [x]2 Moderate Pain  []3 Intense Pain  []4 Unbearable Pain   PAD RISK FACTORS PAD RISK FACTORS PAD RISK FACTORS PAD RISK FACTORS   Tobacco Use Tobacco Use Tobacco Use Tobacco Use   [] Current  [] Former  [x] Never   Change in smoking status   [] Yes      [x] No      Change in smoking status   [] Yes      [x] No     Change in smoking status   [] Yes      [x] No       Diabetes Diabetes Diabetes Diabetes   [] Yes      [x] No  FBS: 107             NA    Hypertension Hypertension Hypertension Hypertension   [x] Yes      [] No    Medication: LISINOPRIL and HYTRIN Medication Changes:  [] Yes      [x] No Medication Changes:  [] Yes      [x] No Medication Changes:  [] Yes      [x] No   HLD/DLD HLD/DLD HLD/DLD HLD/DLD   [x] Yes      [] No  TOTAL CHOL: 117  HDL:  66  LDL:  37  TRI  Medication:  LIPITOR Medication Changes:  [] Yes      [x] No Medication Changes:  [] Yes      [x] No Medication Changes:  [] Yes      [x] No   Heart Disease      Patient history of:  []PCI w/Stent      []OHS-CABG or Valve      []MI      []CAD      []Stroke      []Other:       EDUCATION      Reviewed on Evaluation?   PAD Pain Scale  [x]Yes      [] No  Exercise Safety  [x]Yes      [] No  Equipment Orientation  [x]Yes      [] No  S/S to Report  [x]Yes      [] No  Warm Up/Cool Down  [x]Yes      [] No  Home Exercise  [x]Yes      [] No      PHYSICIAN INTERACTION    Initial Assessment Review PHYSICIAN INTERACTION    30 day Assessment & ITP Review: PHYSICIAN INTERACTION    60 day Assessment & ITP Review: PHYSICIAN INTERACTION    Final Assessment & Discharge Review:   []Initial  Assessment completed     []Reviewed  Treatment Plan and Goals support patient needs/ abilities    Cosigned and dated below: []I agree with the plan  Continue with progression and instruction    []Continue, but with the following changes:      Cosigned and dated below: []I agree with the plan  Continue with progression and instruction    []Continue, but with the following changes:    Cosigned and dated below: []Discharge patient                Cosigned and dated below:     Cosigned by:  Rambo Velazquez MD at 2020  4:26 PM    Revision History     Date/Time  User  Provider Type  Action    2020  4:26 PM  Rambo Velazquez MD  Physician  Cosign    2020  3:45 PM  Verlinda Blunt  Exercise Physiologist  Sign       Date/Time  User  Provider Type  Action    2020  1:14 PM  Rambo Velazquez MD  Physician  Cosign    2020  2:08 PM  Verlinda Blunt  Exercise Physiologist  Sign      Cosigned by:  Rambo Velazquez MD at 2020  1:45 PM    Revision History     Date/Time  User  Provider Type  Action    2020  1:45 PM Diamond Phoenix, MD  Physician  Cosign    9/15/2020  1:28 PM  Marina Arango  Exercise Physiologist  Sign

## 2020-10-20 RX ORDER — ATORVASTATIN CALCIUM 40 MG/1
40 TABLET, FILM COATED ORAL DAILY
Qty: 90 TABLET | Refills: 0 | Status: SHIPPED | OUTPATIENT
Start: 2020-10-20 | End: 2020-11-12 | Stop reason: SDUPTHER

## 2020-10-20 RX ORDER — EZETIMIBE 10 MG/1
10 TABLET ORAL DAILY
Qty: 90 TABLET | Refills: 0 | Status: SHIPPED | OUTPATIENT
Start: 2020-10-20 | End: 2020-11-12 | Stop reason: SDUPTHER

## 2020-10-20 RX ORDER — BISOPROLOL FUMARATE 5 MG/1
5 TABLET ORAL DAILY
Qty: 90 TABLET | Refills: 0 | Status: SHIPPED | OUTPATIENT
Start: 2020-10-20 | End: 2020-11-12 | Stop reason: SDUPTHER

## 2020-10-20 RX ORDER — CLOPIDOGREL BISULFATE 75 MG/1
75 TABLET ORAL DAILY
Qty: 90 TABLET | Refills: 0 | Status: SHIPPED | OUTPATIENT
Start: 2020-10-20 | End: 2020-11-12 | Stop reason: SDUPTHER

## 2020-11-12 ENCOUNTER — OFFICE VISIT (OUTPATIENT)
Dept: CARDIOLOGY CLINIC | Age: 70
End: 2020-11-12
Payer: MEDICARE

## 2020-11-12 VITALS
BODY MASS INDEX: 28.44 KG/M2 | HEIGHT: 72 IN | HEART RATE: 62 BPM | DIASTOLIC BLOOD PRESSURE: 62 MMHG | SYSTOLIC BLOOD PRESSURE: 128 MMHG | WEIGHT: 210 LBS

## 2020-11-12 PROCEDURE — 93000 ELECTROCARDIOGRAM COMPLETE: CPT | Performed by: INTERNAL MEDICINE

## 2020-11-12 PROCEDURE — 99213 OFFICE O/P EST LOW 20 MIN: CPT | Performed by: INTERNAL MEDICINE

## 2020-11-12 RX ORDER — BISOPROLOL FUMARATE 5 MG/1
5 TABLET ORAL DAILY
Qty: 90 TABLET | Refills: 3 | Status: SHIPPED | OUTPATIENT
Start: 2020-11-12 | End: 2021-12-14 | Stop reason: SDUPTHER

## 2020-11-12 RX ORDER — ATORVASTATIN CALCIUM 40 MG/1
40 TABLET, FILM COATED ORAL DAILY
Qty: 90 TABLET | Refills: 3 | Status: SHIPPED | OUTPATIENT
Start: 2020-11-12 | End: 2021-12-14 | Stop reason: SDUPTHER

## 2020-11-12 RX ORDER — EZETIMIBE 10 MG/1
10 TABLET ORAL DAILY
Qty: 90 TABLET | Refills: 3 | Status: SHIPPED | OUTPATIENT
Start: 2020-11-12 | End: 2021-12-14 | Stop reason: SDUPTHER

## 2020-11-12 RX ORDER — CLOPIDOGREL BISULFATE 75 MG/1
75 TABLET ORAL DAILY
Qty: 90 TABLET | Refills: 3 | Status: SHIPPED | OUTPATIENT
Start: 2020-11-12 | End: 2021-12-14 | Stop reason: SDUPTHER

## 2020-11-12 NOTE — PROGRESS NOTES
**This is a Medical/ PA/ APRN Student Note and is charted for educational purposes. The non-physician staff attested note is not to be used for billing purposes or to guide patient care. Please see the physician modifications/ attestation for treatment plan/suggestions. This note has been reviewed and feedback has been provided to the student. *      [address] [place] [address] [address] [place] [address]  Dept: [phone]  Dept Fax: [phone]  Loc: [phone]    Visit Date: 11/12/2020    Mr. Christiane Bourgeois is a 77 y.o. male  who presented for:  Chief Complaint   Patient presents with    6 Month Follow-Up       HPI:   Mr. Christiane Bourgeois is a 77year old male presenting for a 6 month follow up. At this visit, he has no new complaints. He denies chest pain, headache, SOB, lightheadedness. He is s/p non-vein Fem-Fem at [facility] (7/1/2019) He was seen in May 2020 for claudication symptoms. Since then, he attended a 12 week PAD rehab that has significantly decreased his symptoms. After rehab, he signed up for a membership at the [facility] and works out 3-4 times per week. He is able to walk from the hospital parking lot to the doctor's office without taking a break. He denies tobacco use and admits to occasional alcohol use. Right CASSY 0.83.  Left CASSY 1.09 (05/20/2020)      Current Outpatient Medications:     bisoprolol (ZEBETA) 5 MG tablet, Take 1 tablet by mouth daily, Disp: 90 tablet, Rfl: 0    clopidogrel (PLAVIX) 75 MG tablet, Take 1 tablet by mouth daily, Disp: 90 tablet, Rfl: 0    ezetimibe (ZETIA) 10 MG tablet, Take 1 tablet by mouth daily, Disp: 90 tablet, Rfl: 0    atorvastatin (LIPITOR) 40 MG tablet, Take 1 tablet by mouth daily, Disp: 90 tablet, Rfl: 0    Calcium Carbonate (CALCIUM 600 PO), Take 1 tablet by mouth 2 times daily, Disp: , Rfl:     terazosin (HYTRIN) 5 MG capsule, TAKE 1 CAPSULE DAILY, Disp: 90 capsule, Rfl: 3    lisinopril (PRINIVIL;ZESTRIL) 40 MG tablet, Take 40 mg by mouth daily. , Disp: , Rfl:     amLODIPine (NORVASC) 10 MG tablet, Take 10 mg by mouth daily. , Disp: , Rfl:     GARLIC, Take 4,619 mg by mouth daily , Disp: , Rfl:     furosemide (LASIX) 20 MG tablet, Take 20 mg by mouth as needed , Disp: , Rfl:     Past Medical History  Nhung Rae  has a past medical history of Arthritis, Electrolyte imbalance, Gout, HTN (hypertension), Hyperlipidemia, Hypochloremia, and Hyponatremia. Social History  Aaron NAVARRO  reports that he has never smoked. He has never used smokeless tobacco. He reports current alcohol use. He reports current drug use. Drug: Marijuana. Family History  Nhung Rae family history includes Diabetes in his mother. There is no family history of bicuspid aortic valve, aneurysms, heart transplant, pacemakers, defibrillators, or sudden cardiac death. Past Surgical History   Past Surgical History:   Procedure Laterality Date    BACK SURGERY  2017    FEMORAL-FEMORAL BYPASS GRAFT  07/01/2019    [facility] with Dr. Brooke Quiroga       Review of Systems   Constitutional: Negative for chills and fever  HENT: Negative for congestion, sinus pressure, sneezing and sore throat. Eyes: Negative for pain, discharge, redness and itching. Respiratory: Negative for apnea, cough  Gastrointestinal: Negative for blood in stool, constipation, diarrhea   Endocrine: Negative for cold intolerance, heat intolerance, polydipsia. Genitourinary: Negative for dysuria, enuresis, flank pain and hematuria. Musculoskeletal: Negative for arthralgias, joint swelling and neck pain. Neurological: Negative for numbness and headaches. Psychiatric/Behavioral: Negative for agitation, confusion, decreased concentration and dysphoric mood.      Objective:     /62   Pulse 62   Ht 5' 11.5\" (1.816 m)   Wt 210 lb (95.3 kg)   BMI 28.88 kg/m²     Wt Readings from Last 3 Encounters:   11/12/20 210 lb (95.3 kg)   05/21/20 216 lb 6.4 oz (98.2 kg)   09/18/19 210 lb (95.3 kg) BP Readings from Last 3 Encounters:   11/12/20 128/62   05/21/20 112/62   09/18/19 114/62       Nursing note and vitals reviewed. Physical Exam   Constitutional: Oriented to person, place, and time. Appears well-developed and well-nourished. HENT:   Head: Normocephalic and atraumatic. Eyes: EOM are normal. Pupils are equal, round, and reactive to light. Neck: Normal range of motion. Neck supple. No JVD present. Cardiovascular: Normal rate, regular rhythm, normal heart sounds and intact distal pulses. No murmur heard. Pulmonary/Chest: Effort normal and breath sounds normal. No respiratory distress. No wheezes. No rales. Abdominal: Soft. Bowel sounds are normal. No distension. There is no tenderness. Musculoskeletal: Normal range of motion. No edema. Neurological: Alert and oriented to person, place, and time. No cranial nerve deficit. Coordination normal.   Skin: Skin is warm and dry. Psychiatric: Normal mood and affect.        No results found for: CKTOTAL, CKMB, CKMBINDEX    Lab Results   Component Value Date    WBC 5.6 05/07/2019    RBC 3.92 05/07/2019    RBC 4.20 02/01/2012    HGB 12.8 05/07/2019    HCT 36.8 05/07/2019    HCT 41.3 02/01/2012    MCV 93.9 05/07/2019    MCH 32.7 05/07/2019    MCHC 34.8 05/07/2019    RDW 11.8 02/01/2012     05/07/2019    MPV 9.8 05/07/2019       Lab Results   Component Value Date     05/07/2019    K 4.0 05/07/2019    CL 96 05/07/2019    CO2 20 05/07/2019    BUN 9 05/07/2019    CREATININE 0.8 05/07/2019    CALCIUM 9.2 05/07/2019    LABGLOM >90 05/07/2019    GLUCOSE 107 05/07/2019    GLUCOSE 120 04/28/2015       Lab Results   Component Value Date    BILITOT NEGATIVE 02/21/2012       No results found for: MG    Lab Results   Component Value Date    INR 1.21 (H) 05/07/2019         No results found for: LABA1C    Lab Results   Component Value Date    TRIG 71 05/07/2019    HDL 66 05/07/2019    LDLCALC 37 05/07/2019       No results found for: TSH Testing Reviewed:      I have individually reviewed the cardiac test below:    ECHO: No results found for this or any previous visit. Assessment/Plan   1. PVD--s/p non-vein Fem-Fem 7/1/19 at [facility] by Sam Gong : Patient did well with rehab. Continue going to the [facility]. Patient denies any symptoms of pain, tingling, or numbness at this time. On plavix. Will need to get surveillance CASSY. Pletal intolerance. No claudication symptoms. Completed PAD rehab.   2. HTN: BP today 128/62. Well controlled with Lasix, lisinopril, zebeta  3. HLD: last LDL was 37 (05/7/2019). Will need to get a lipid panel. Continue lipitor 40mg and zetia. Disposition:  6-12 months     Electronically signed by Francois Mccormick   11/12/2020 at 1:26 PM EST    Attending Supervising Physician's R Mellisa Tillman 106  I performed a history and physical examination on the patient and discussed the management with the resident physician/med student. I reviewed and agree with the findings and plan as documented in the resident's note/med student's note.     Electronically signed by Aimee Dsouza MD on 11/12/20 at 2:12 PM EST

## 2020-11-13 LAB
CHOLESTEROL/HDL RELATIVE RISK: 1.8 (ref 4–5)
CHOLESTEROL: 115 MG/DL
DIRECT-LDL / HDL RISK: 0.6
HDLC SERPL-MCNC: 63 MG/DL
LDL CHOLESTEROL DIRECT: 43 MG/DL
TRIGL SERPL-MCNC: 47 MG/DL
VLDLC SERPL CALC-MCNC: 9 MG/DL

## 2020-11-20 ENCOUNTER — HOSPITAL ENCOUNTER (OUTPATIENT)
Dept: INTERVENTIONAL RADIOLOGY/VASCULAR | Age: 70
Discharge: HOME | End: 2020-11-20
Payer: MEDICARE

## 2020-11-20 PROCEDURE — 93922 UPR/L XTREMITY ART 2 LEVELS: CPT

## 2020-11-23 ENCOUNTER — TELEPHONE (OUTPATIENT)
Dept: CARDIOLOGY CLINIC | Age: 70
End: 2020-11-23

## 2020-11-23 NOTE — TELEPHONE ENCOUNTER
Result CASSY-  . Mildly decreased right CASSY measuring 0.87 referencing the dorsalis pedis artery and 0.72 using the posterior tibial artery. 2. Normal left CASSY measuring 1.21 using the dorsalis pedis artery. The CASSY utilizing the left posterior tibial artery is elevated measuring 1.41 presumably due to arterial calcinosis. 3. Doppler:  Biphasic Doppler waveforms are seen on the left.  Monophasic Doppler waveforms are seen on the right.           CASSY    RIGHT         BROOKS----->0.87    PTA----->0.72              CASSY    LEFT         BROOKS----->1.21    PTA----->1.41

## 2021-02-08 ENCOUNTER — IMMUNIZATION (OUTPATIENT)
Dept: PRIMARY CARE CLINIC | Age: 71
End: 2021-02-08
Payer: MEDICARE

## 2021-02-08 PROCEDURE — 0011A COVID-19, MODERNA VACCINE 100MCG/0.5ML DOSE: CPT | Performed by: FAMILY MEDICINE

## 2021-02-08 PROCEDURE — 91301 COVID-19, MODERNA VACCINE 100MCG/0.5ML DOSE: CPT | Performed by: FAMILY MEDICINE

## 2021-03-08 ENCOUNTER — IMMUNIZATION (OUTPATIENT)
Dept: PRIMARY CARE CLINIC | Age: 71
End: 2021-03-08
Payer: MEDICARE

## 2021-03-08 PROCEDURE — 0012A COVID-19, MODERNA VACCINE 100MCG/0.5ML DOSE: CPT

## 2021-03-08 PROCEDURE — 91301 COVID-19, MODERNA VACCINE 100MCG/0.5ML DOSE: CPT

## 2021-12-14 ENCOUNTER — OFFICE VISIT (OUTPATIENT)
Dept: CARDIOLOGY CLINIC | Age: 71
End: 2021-12-14
Payer: MEDICARE

## 2021-12-14 VITALS
SYSTOLIC BLOOD PRESSURE: 132 MMHG | BODY MASS INDEX: 28.42 KG/M2 | DIASTOLIC BLOOD PRESSURE: 80 MMHG | HEART RATE: 76 BPM | WEIGHT: 209.8 LBS | HEIGHT: 72 IN

## 2021-12-14 DIAGNOSIS — I73.9 PAD (PERIPHERAL ARTERY DISEASE) (HCC): Primary | ICD-10-CM

## 2021-12-14 PROCEDURE — 93000 ELECTROCARDIOGRAM COMPLETE: CPT | Performed by: INTERNAL MEDICINE

## 2021-12-14 PROCEDURE — 99213 OFFICE O/P EST LOW 20 MIN: CPT | Performed by: INTERNAL MEDICINE

## 2021-12-14 RX ORDER — EZETIMIBE 10 MG/1
10 TABLET ORAL DAILY
Qty: 90 TABLET | Refills: 3 | Status: SHIPPED | OUTPATIENT
Start: 2021-12-14

## 2021-12-14 RX ORDER — MAGNESIUM OXIDE 400 MG/1
400 TABLET ORAL DAILY
COMMUNITY

## 2021-12-14 RX ORDER — ASPIRIN 81 MG/1
81 TABLET ORAL DAILY
COMMUNITY

## 2021-12-14 RX ORDER — CLOPIDOGREL BISULFATE 75 MG/1
75 TABLET ORAL DAILY
Qty: 90 TABLET | Refills: 3 | Status: SHIPPED | OUTPATIENT
Start: 2021-12-14

## 2021-12-14 RX ORDER — ATORVASTATIN CALCIUM 40 MG/1
40 TABLET, FILM COATED ORAL DAILY
Qty: 90 TABLET | Refills: 3 | Status: SHIPPED | OUTPATIENT
Start: 2021-12-14

## 2021-12-14 RX ORDER — ZINC SULFATE 50(220)MG
50 CAPSULE ORAL DAILY
COMMUNITY

## 2021-12-14 RX ORDER — BISOPROLOL FUMARATE 5 MG/1
5 TABLET ORAL DAILY
Qty: 90 TABLET | Refills: 3 | Status: SHIPPED | OUTPATIENT
Start: 2021-12-14

## 2021-12-14 RX ORDER — ASCORBIC ACID 500 MG
500 TABLET ORAL DAILY
COMMUNITY

## 2021-12-14 NOTE — PROGRESS NOTES
[id number]  [place] [address]  Dept: [phone]  Dept Fax: [phone]  Loc: [phone]    Visit Date: 12/14/2021    Mr. Tarik Ricci is a 80 y.o. male  who presented for:  PAD    HPI:   This is a 80year old male PMH PAD s/p femoral-femoral bypass graft on the left leg, HTN, and HLD, who is coming in for a 1 year follow-up visit. The patient recently visited his vascular surgeon last month and reports that he is able to walk from the parking lot to the clinic without having any claudication in his legs. He says that the maximum distance he can walk without feeling significant cramping in his legs. The patient denies any current symptoms including chest pain, shortness of breath, and continues to work out 3-4 times a week at [employer]. He previously attended 12 week PAD rehab after which he felt that his symptoms had decreased. He only feels occasional cramping in his legs and has never had actual stents placed in his legs. He denies any history of MI but does have diastolic heart failure for which he takes Lasix 20 mg. The patient's last CASSY's in November 2020 were 0.87 (anterior tibial) and 0.72 (posterior tibial) for the right leg and 1.21 (BROOKS) and 1.41 (PTA).          Current Outpatient Medications:     aspirin 81 MG EC tablet, Take 81 mg by mouth daily, Disp: , Rfl:     magnesium oxide (MAG-OX) 400 MG tablet, Take 400 mg by mouth daily, Disp: , Rfl:     zinc sulfate (ZINCATE) 220 (50 Zn) MG capsule, Take 50 mg by mouth daily, Disp: , Rfl:     ascorbic acid (VITAMIN C) 500 MG tablet, Take 500 mg by mouth daily, Disp: , Rfl:     ezetimibe (ZETIA) 10 MG tablet, Take 1 tablet by mouth daily, Disp: 90 tablet, Rfl: 3    clopidogrel (PLAVIX) 75 MG tablet, Take 1 tablet by mouth daily, Disp: 90 tablet, Rfl: 3    bisoprolol (ZEBETA) 5 MG tablet, Take 1 tablet by mouth daily, Disp: 90 tablet, Rfl: 3    atorvastatin (LIPITOR) 40 MG tablet, Take 1 tablet by mouth daily, Disp: 90 tablet, Rfl: 3    Calcium Carbonate (CALCIUM 600 PO), Take 1 tablet by mouth 2 times daily, Disp: , Rfl:     terazosin (HYTRIN) 5 MG capsule, TAKE 1 CAPSULE DAILY, Disp: 90 capsule, Rfl: 3    lisinopril (PRINIVIL;ZESTRIL) 40 MG tablet, Take 40 mg by mouth daily. , Disp: , Rfl:     amLODIPine (NORVASC) 10 MG tablet, Take 10 mg by mouth daily. , Disp: , Rfl:     GARLIC, Take 6,531 mg by mouth daily , Disp: , Rfl:     Past Medical History  Coty Castano  has a past medical history of Arthritis, Electrolyte imbalance, Gout, HTN (hypertension), Hyperlipidemia, Hypochloremia, and Hyponatremia. Social History  Maryjane NAVARRO  reports that he has never smoked. He has never used smokeless tobacco. He reports current alcohol use. He reports current drug use. Drug: Marijuana Amy Kos). Family History  Coty Catsano family history includes Diabetes in his mother. There is no family history of bicuspid aortic valve, aneurysms, heart transplant, pacemakers, defibrillators, or sudden cardiac death. Past Surgical History   Past Surgical History:   Procedure Laterality Date    BACK SURGERY  2017    FEMORAL-FEMORAL BYPASS GRAFT  07/01/2019    [facility] with Dr. Ranjan Clark       Review of Systems   Constitutional: Negative for chills and fever  HENT: Negative for congestion, sinus pressure, sneezing and sore throat. Eyes: Negative for pain, discharge, redness and itching. Respiratory: Negative for apnea, cough  Gastrointestinal: Negative for blood in stool, constipation, diarrhea   Endocrine: Negative for cold intolerance, heat intolerance, polydipsia. Genitourinary: Negative for dysuria, enuresis, flank pain and hematuria. Musculoskeletal: Negative for arthralgias, joint swelling and neck pain. Neurological: Negative for numbness and headaches. Psychiatric/Behavioral: Negative for agitation, confusion, decreased concentration and dysphoric mood.      Objective:     BP 132/80   Pulse 76   Ht 5' 11.5\" (1.816 m)   Wt 209 lb 12.8 oz (95.2 kg)   BMI 28.85 kg/m²     Wt Readings from Last 3 Encounters:   12/14/21 209 lb 12.8 oz (95.2 kg)   11/12/20 210 lb (95.3 kg)   05/21/20 216 lb 6.4 oz (98.2 kg)     BP Readings from Last 3 Encounters:   12/14/21 132/80   11/12/20 128/62   05/21/20 112/62       Nursing note and vitals reviewed. Physical Exam   Constitutional: Oriented to person, place, and time. Appears well-developed and well-nourished. HENT:   Head: Normocephalic and atraumatic. Eyes: EOM are normal. Pupils are equal, round, and reactive to light. Neck: Normal range of motion. Neck supple. No JVD present. Cardiovascular: Normal rate, regular rhythm, normal heart sounds and intact distal pulses. No murmur heard. Pulmonary/Chest: Effort normal and breath sounds normal. No respiratory distress. No wheezes. No rales. Abdominal: Soft. Bowel sounds are normal. No distension. There is no tenderness. Musculoskeletal: Normal range of motion; Mild 1+ non-pitting edema noted on the legs bilaterally  Neurological: Alert and oriented to person, place, and time. No cranial nerve deficit. Coordination normal.   Skin: Skin is warm and dry. Psychiatric: Normal mood and affect.        No results found for: CKTOTAL, CKMB, CKMBINDEX    Lab Results   Component Value Date    WBC 5.6 05/07/2019    RBC 3.92 05/07/2019    RBC 4.20 02/01/2012    HGB 12.8 05/07/2019    HCT 36.8 05/07/2019    HCT 41.3 02/01/2012    MCV 93.9 05/07/2019    MCH 32.7 05/07/2019    MCHC 34.8 05/07/2019    RDW 11.8 02/01/2012     05/07/2019    MPV 9.8 05/07/2019       Lab Results   Component Value Date     05/07/2019    K 4.0 05/07/2019    CL 96 05/07/2019    CO2 20 05/07/2019    BUN 9 05/07/2019    CREATININE 0.8 05/07/2019    CALCIUM 9.2 05/07/2019    LABGLOM >90 05/07/2019    GLUCOSE 107 05/07/2019    GLUCOSE 120 04/28/2015       Lab Results   Component Value Date    BILITOT NEGATIVE 02/21/2012       No results found for: MG    Lab Results   Component Value Date    INR 1.21 (H) 05/07/2019         No results found for: LABA1C    Lab Results   Component Value Date    TRIG 47 11/13/2020    HDL 63 11/13/2020    LDLCALC 37 05/07/2019    LDLDIRECT 43 11/13/2020       No results found for: TSH      Testing Reviewed:      I have individually reviewed the cardiac test below:    ECHO: No results found for this or any previous visit. Assessment/Plan   1. PAD s/p left femoral-femoral bypass graft at [facility], 7/2019  2. History of essential hypertension  3. History of HLD    -Will get surveillance CASSY  -Lipid panel per primary care; patient has appointment with PCP tomorrow  -Blood pressures within normal limits  -Continue Plavix  -Continue Lasix, lisinopril, and terazosin  -Continue bisoprolol and amlodipine  -Continue lipitor  -Continue zetia     Electronically signed by Juhi Mclain DO   12/14/2021 at 2:04 PM EST      Attending Supervising Physician's MELANIE Tillman 106  I performed a history and physical examination on the patient and discussed the management with the resident physician. I reviewed and agree with the findings and plan as documented in the resident's note except for as noted below. No symptoms of claudication, s/p non-vein Fem-Fem at [facility] (7/1/2019). He is walking without issues. No ulcers or wounds. Check CASSY, continue current medical therapy. Discussed diet/exercise/BP/weight loss/health lifestyle choices/lipids; the patient understands the goals and will try to comply.     Electronically signed by Teri Simpson MD on 12/14/21 at 2:31 PM EST

## 2021-12-17 ENCOUNTER — HOSPITAL ENCOUNTER (OUTPATIENT)
Dept: INTERVENTIONAL RADIOLOGY/VASCULAR | Age: 71
Discharge: HOME | End: 2021-12-17
Payer: MEDICARE

## 2021-12-17 DIAGNOSIS — I73.9 PAD (PERIPHERAL ARTERY DISEASE) (HCC): ICD-10-CM

## 2021-12-17 PROCEDURE — 93922 UPR/L XTREMITY ART 2 LEVELS: CPT

## 2021-12-20 ENCOUNTER — TELEPHONE (OUTPATIENT)
Dept: CARDIOLOGY CLINIC | Age: 71
End: 2021-12-20

## 2021-12-20 NOTE — TELEPHONE ENCOUNTER
Results of CASSY  . Moderately decreased right CASSY measuring 0.70. 2. The left ankle brachial index could not BE obtained due to lack of compressibility of the arteries which is presumably due to arterial calcinosis. 3. Doppler:  There are normal-appearing biphasic Doppler waveforms seen throughout the left leg.  Abnormal monophasic waveforms are seen in the right leg.

## 2022-12-01 ENCOUNTER — OFFICE VISIT (OUTPATIENT)
Dept: CARDIOLOGY CLINIC | Age: 72
End: 2022-12-01
Payer: MEDICARE

## 2022-12-01 VITALS
HEIGHT: 72 IN | BODY MASS INDEX: 27.9 KG/M2 | DIASTOLIC BLOOD PRESSURE: 62 MMHG | WEIGHT: 206 LBS | HEART RATE: 72 BPM | SYSTOLIC BLOOD PRESSURE: 112 MMHG

## 2022-12-01 DIAGNOSIS — I73.9 PAD (PERIPHERAL ARTERY DISEASE) (HCC): Primary | ICD-10-CM

## 2022-12-01 PROCEDURE — 99212 OFFICE O/P EST SF 10 MIN: CPT | Performed by: INTERNAL MEDICINE

## 2022-12-01 PROCEDURE — 3074F SYST BP LT 130 MM HG: CPT | Performed by: INTERNAL MEDICINE

## 2022-12-01 PROCEDURE — 3078F DIAST BP <80 MM HG: CPT | Performed by: INTERNAL MEDICINE

## 2022-12-01 PROCEDURE — 1123F ACP DISCUSS/DSCN MKR DOCD: CPT | Performed by: INTERNAL MEDICINE

## 2022-12-01 NOTE — PROGRESS NOTES
[facility] [address]  [place] [address]  Dept: [phone]  Dept Fax: [phone]  Loc: [phone]    Visit Date: 12/1/2022    Mr. Aida Frias is a 71 y.o. male  who presented for:  Chief Complaint   Patient presents with    1 Year Follow Up       HPI:   HPI   71year old male PMH PAD s/p femoral-femoral bypass graft on the left leg, HTN, and HLD who presents for follow-up. Has been following with [facility]. Intermittent smoking. No chest pain, angina, CLEARY, orthopnea, PND, sob at rest, palpitations, LE edema, or syncope. Denies leg pain, ulcers, color change, temperature change, or change in walking distance. DAPT without bleeding, has some mild bruising. Could not tolerate Pletal due to HA. Current Outpatient Medications:     aspirin 81 MG EC tablet, Take 81 mg by mouth daily, Disp: , Rfl:     magnesium oxide (MAG-OX) 400 MG tablet, Take 400 mg by mouth daily, Disp: , Rfl:     zinc sulfate (ZINCATE) 220 (50 Zn) MG capsule, Take 50 mg by mouth daily, Disp: , Rfl:     ascorbic acid (VITAMIN C) 500 MG tablet, Take 500 mg by mouth daily, Disp: , Rfl:     ezetimibe (ZETIA) 10 MG tablet, Take 1 tablet by mouth daily, Disp: 90 tablet, Rfl: 3    clopidogrel (PLAVIX) 75 MG tablet, Take 1 tablet by mouth daily, Disp: 90 tablet, Rfl: 3    bisoprolol (ZEBETA) 5 MG tablet, Take 1 tablet by mouth daily, Disp: 90 tablet, Rfl: 3    atorvastatin (LIPITOR) 40 MG tablet, Take 1 tablet by mouth daily, Disp: 90 tablet, Rfl: 3    Calcium Carbonate (CALCIUM 600 PO), Take 1 tablet by mouth 2 times daily, Disp: , Rfl:     terazosin (HYTRIN) 5 MG capsule, TAKE 1 CAPSULE DAILY, Disp: 90 capsule, Rfl: 3    lisinopril (PRINIVIL;ZESTRIL) 40 MG tablet, Take 40 mg by mouth daily. , Disp: , Rfl:     amLODIPine (NORVASC) 10 MG tablet, Take 10 mg by mouth daily. , Disp: , Rfl:     GARLIC, Take 0,240 mg by mouth daily , Disp: , Rfl:     Past Medical History  Rae Choi  has a past medical history of Arthritis, Electrolyte imbalance, Gout, HTN (hypertension), Hyperlipidemia, Hypochloremia, and Hyponatremia. Social History  Jose NAVARRO  reports that he has never smoked. He has never used smokeless tobacco. He reports current alcohol use. He reports current drug use. Drug: Marijuana Apolloell Goldberg). Family History  Rae Choi family history includes Diabetes in his mother. There is no family history of bicuspid aortic valve, aneurysms, heart transplant, pacemakers, defibrillators, or sudden cardiac death. Past Surgical History   Past Surgical History:   Procedure Laterality Date    BACK SURGERY  2017    FEMORAL-FEMORAL BYPASS GRAFT  07/01/2019    [facility] with Dr. Theodora Pulliam       Review of Systems   Constitutional: Negative for chills and fever  HENT: Negative for congestion, sinus pressure, sneezing and sore throat. Eyes: Negative for pain, discharge, redness and itching. Respiratory: Negative for apnea, cough  Gastrointestinal: Negative for blood in stool, constipation, diarrhea   Endocrine: Negative for cold intolerance, heat intolerance, polydipsia. Genitourinary: Negative for dysuria, enuresis, flank pain and hematuria. Musculoskeletal: Negative for arthralgias, joint swelling and neck pain. Neurological: Negative for numbness and headaches. Psychiatric/Behavioral: Negative for agitation, confusion, decreased concentration and dysphoric mood. Objective:     /62   Pulse 72   Ht 6' (1.829 m)   Wt 206 lb (93.4 kg)   BMI 27.94 kg/m²     Wt Readings from Last 3 Encounters:   12/01/22 206 lb (93.4 kg)   12/14/21 209 lb 12.8 oz (95.2 kg)   11/12/20 210 lb (95.3 kg)     BP Readings from Last 3 Encounters:   12/01/22 112/62   12/14/21 132/80   11/12/20 128/62       Nursing note and vitals reviewed. Physical Exam   Constitutional: Oriented to person, place, and time. Appears well-developed and well-nourished.    HENT:   Head: Normocephalic and atraumatic. Eyes: EOM are normal. Pupils are equal, round, and reactive to light. Neck: Normal range of motion. Neck supple. No JVD present. Cardiovascular: Normal rate, regular rhythm, normal heart sounds and intact distal pulses. No murmur heard. Pulmonary/Chest: Effort normal and breath sounds normal. No respiratory distress. No wheezes. No rales. Abdominal: Soft. Bowel sounds are normal. No distension. There is no tenderness. Musculoskeletal: Normal range of motion. No edema. Neurological: Alert and oriented to person, place, and time. No cranial nerve deficit. Coordination normal.   Skin: Skin is warm and dry. Psychiatric: Normal mood and affect.        No results found for: CKTOTAL, CKMB, CKMBINDEX    Lab Results   Component Value Date/Time    WBC 5.6 05/07/2019 09:26 AM    RBC 3.92 05/07/2019 09:26 AM    RBC 4.20 02/01/2012 03:10 PM    HGB 12.8 05/07/2019 09:26 AM    HCT 36.8 05/07/2019 09:26 AM    HCT 41.3 02/01/2012 03:10 PM    MCV 93.9 05/07/2019 09:26 AM    MCH 32.7 05/07/2019 09:26 AM    MCHC 34.8 05/07/2019 09:26 AM    RDW 11.8 02/01/2012 03:10 PM     05/07/2019 09:26 AM    MPV 9.8 05/07/2019 09:26 AM       Lab Results   Component Value Date/Time     05/07/2019 09:26 AM    K 4.0 05/07/2019 09:26 AM    CL 96 05/07/2019 09:26 AM    CO2 20 05/07/2019 09:26 AM    BUN 9 05/07/2019 09:26 AM    CREATININE 0.8 05/07/2019 09:26 AM    CALCIUM 9.2 05/07/2019 09:26 AM    LABGLOM >90 05/07/2019 09:26 AM    GLUCOSE 107 05/07/2019 09:26 AM    GLUCOSE 120 04/28/2015 10:32 AM       Lab Results   Component Value Date/Time    BILITOT NEGATIVE 02/21/2012 02:14 PM       No results found for: MG    Lab Results   Component Value Date    INR 1.21 (H) 05/07/2019         No results found for: LABA1C    Lab Results   Component Value Date/Time    TRIG 47 11/13/2020 09:47 AM    HDL 63 11/13/2020 09:47 AM    LDLCALC 37 05/07/2019 09:26 AM    LDLDIRECT 43 11/13/2020 09:47 AM       No results found for: TSH      Testing Reviewed:      I have individually reviewed the cardiac test below:    ECHO: No results found for this or any previous visit. Assessment/Plan   PAD s/p left femoral-femoral bypass graft at [facility], 7/2019  History of essential hypertension  History of HLD  ABIs are reduced on the right per [facility] note, however, cannot see CASSY. He has no limb symptoms. He is on DAPT and does not want to consider Xarelo. He is on statin therapy as well. Avoid all smoking. Continue walking. Discussed diet/exercise/BP/weight loss/health lifestyle choices/lipids; the patient understands the goals and will try to comply.     Disposition:  1 year           Electronically signed by Joyce Chaudhary MD   12/1/2022 at 1:59 PM EST

## 2022-12-16 RX ORDER — ATORVASTATIN CALCIUM 40 MG/1
TABLET, FILM COATED ORAL
Qty: 90 TABLET | Refills: 4 | Status: SHIPPED | OUTPATIENT
Start: 2022-12-16

## 2022-12-16 RX ORDER — CLOPIDOGREL BISULFATE 75 MG/1
TABLET ORAL
Qty: 90 TABLET | Refills: 4 | Status: SHIPPED | OUTPATIENT
Start: 2022-12-16

## 2022-12-16 RX ORDER — BISOPROLOL FUMARATE 5 MG/1
TABLET, FILM COATED ORAL
Qty: 90 TABLET | Refills: 4 | Status: SHIPPED | OUTPATIENT
Start: 2022-12-16

## 2022-12-24 RX ORDER — EZETIMIBE 10 MG/1
TABLET ORAL
Qty: 90 TABLET | Refills: 3 | Status: SHIPPED | OUTPATIENT
Start: 2022-12-24

## 2023-02-07 RX ORDER — CLOPIDOGREL BISULFATE 75 MG/1
TABLET ORAL
Qty: 90 TABLET | Refills: 3 | Status: SHIPPED | OUTPATIENT
Start: 2023-02-07 | End: 2023-02-09 | Stop reason: SDUPTHER

## 2023-02-07 RX ORDER — BISOPROLOL FUMARATE 5 MG/1
TABLET, FILM COATED ORAL
Qty: 90 TABLET | Refills: 3 | Status: SHIPPED | OUTPATIENT
Start: 2023-02-07

## 2023-02-07 RX ORDER — EZETIMIBE 10 MG/1
TABLET ORAL
Qty: 90 TABLET | Refills: 3 | Status: SHIPPED | OUTPATIENT
Start: 2023-02-07

## 2023-02-07 RX ORDER — ATORVASTATIN CALCIUM 40 MG/1
TABLET, FILM COATED ORAL
Qty: 90 TABLET | Refills: 3 | Status: SHIPPED | OUTPATIENT
Start: 2023-02-07

## 2023-02-09 RX ORDER — CLOPIDOGREL BISULFATE 75 MG/1
TABLET ORAL
Qty: 90 TABLET | Refills: 3 | Status: SHIPPED | OUTPATIENT
Start: 2023-02-09

## 2023-02-24 ENCOUNTER — TELEPHONE (OUTPATIENT)
Dept: CARDIOLOGY CLINIC | Age: 73
End: 2023-02-24

## 2023-02-24 NOTE — TELEPHONE ENCOUNTER
Pt is having colonoscopy and they told them to ask Dr Daniel Vora if it is okay to stop Plavix and ASA 5 days prior. Please advise.

## 2023-12-05 ENCOUNTER — OFFICE VISIT (OUTPATIENT)
Dept: CARDIOLOGY CLINIC | Age: 73
End: 2023-12-05
Payer: MEDICARE

## 2023-12-05 VITALS
HEIGHT: 72 IN | DIASTOLIC BLOOD PRESSURE: 80 MMHG | BODY MASS INDEX: 28.04 KG/M2 | HEART RATE: 64 BPM | SYSTOLIC BLOOD PRESSURE: 134 MMHG | WEIGHT: 207 LBS

## 2023-12-05 DIAGNOSIS — I73.9 PAD (PERIPHERAL ARTERY DISEASE) (HCC): Primary | ICD-10-CM

## 2023-12-05 DIAGNOSIS — I73.9 PVD (PERIPHERAL VASCULAR DISEASE) (HCC): ICD-10-CM

## 2023-12-05 DIAGNOSIS — I70.213 ATHEROSCLEROSIS OF NATIVE ARTERY OF BOTH LOWER EXTREMITIES WITH INTERMITTENT CLAUDICATION (HCC): ICD-10-CM

## 2023-12-05 DIAGNOSIS — I10 PRIMARY HYPERTENSION: ICD-10-CM

## 2023-12-05 PROCEDURE — 3079F DIAST BP 80-89 MM HG: CPT | Performed by: NURSE PRACTITIONER

## 2023-12-05 PROCEDURE — G8427 DOCREV CUR MEDS BY ELIG CLIN: HCPCS | Performed by: NURSE PRACTITIONER

## 2023-12-05 PROCEDURE — G8484 FLU IMMUNIZE NO ADMIN: HCPCS | Performed by: NURSE PRACTITIONER

## 2023-12-05 PROCEDURE — 99214 OFFICE O/P EST MOD 30 MIN: CPT | Performed by: NURSE PRACTITIONER

## 2023-12-05 PROCEDURE — 1036F TOBACCO NON-USER: CPT | Performed by: NURSE PRACTITIONER

## 2023-12-05 PROCEDURE — G8419 CALC BMI OUT NRM PARAM NOF/U: HCPCS | Performed by: NURSE PRACTITIONER

## 2023-12-05 PROCEDURE — 3075F SYST BP GE 130 - 139MM HG: CPT | Performed by: NURSE PRACTITIONER

## 2023-12-05 PROCEDURE — 1123F ACP DISCUSS/DSCN MKR DOCD: CPT | Performed by: NURSE PRACTITIONER

## 2023-12-05 PROCEDURE — 3017F COLORECTAL CA SCREEN DOC REV: CPT | Performed by: NURSE PRACTITIONER

## 2023-12-05 RX ORDER — BISOPROLOL FUMARATE 5 MG/1
TABLET, FILM COATED ORAL
Qty: 90 TABLET | Refills: 3 | Status: SHIPPED | OUTPATIENT
Start: 2023-12-05

## 2023-12-05 RX ORDER — CLOPIDOGREL BISULFATE 75 MG/1
TABLET ORAL
Qty: 90 TABLET | Refills: 3 | Status: SHIPPED | OUTPATIENT
Start: 2023-12-05

## 2023-12-05 RX ORDER — EZETIMIBE 10 MG/1
TABLET ORAL
Qty: 90 TABLET | Refills: 3 | Status: SHIPPED | OUTPATIENT
Start: 2023-12-05

## 2023-12-05 RX ORDER — ATORVASTATIN CALCIUM 40 MG/1
TABLET, FILM COATED ORAL
Qty: 90 TABLET | Refills: 3 | Status: SHIPPED | OUTPATIENT
Start: 2023-12-05

## 2023-12-05 NOTE — PROGRESS NOTES
[address] [address]  Dept: [phone]  Dept Fax: [phone]  Loc: [phone]    Visit Date: 12/5/2023    Mr. Uche Evangelista is a 68 y.o. male  who presented for: 1 year follow-up    Primary Cardiologist: Soha Arellano MD      Chief Complaint   Patient presents with    1 Year Follow Up       HPI:   HPI   Last seen in office on 12/1/2022 per Dr. Gita Eubanks. Per office note:  71year old male PMH PAD s/p femoral-femoral bypass graft on the left leg, HTN, and HLD who presents for follow-up. Has been following with [facility]. Intermittent smoking. No chest pain, angina, CLEARY, orthopnea, PND, sob at rest, palpitations, LE edema, or syncope. Denies leg pain, ulcers, color change, temperature change, or change in walking distance. DAPT without bleeding, has some mild bruising. Could not tolerate Pletal due to HA. Assessment/Plan   PAD s/p left femoral-femoral bypass graft at [facility], 7/2019  History of essential hypertension  History of HLD  ABIs are reduced on the right per [facility] note, however, cannot see CASSY. He has no limb symptoms. He is on DAPT and does not want to consider Xarelo. He is on statin therapy as well. Avoid all smoking. Continue walking. Discussed diet/exercise/BP/weight loss/health lifestyle choices/lipids; the patient understands the goals and will try to comply.   Disposition: 1 year        Current Outpatient Medications:     clopidogrel (PLAVIX) 75 MG tablet, TAKE 1 TABLET DAILY, Disp: 90 tablet, Rfl: 3    ezetimibe (ZETIA) 10 MG tablet, TAKE 1 TABLET DAILY, Disp: 90 tablet, Rfl: 3    bisoprolol (ZEBETA) 5 MG tablet, TAKE 1 TABLET DAILY, Disp: 90 tablet, Rfl: 3    atorvastatin (LIPITOR) 40 MG tablet, TAKE 1 TABLET DAILY, Disp: 90 tablet, Rfl: 3    aspirin 81 MG EC tablet, Take 1 tablet by mouth daily, Disp: , Rfl:     magnesium oxide (MAG-OX) 400 MG tablet, Take 1 tablet by mouth daily, Disp: , Rfl:

## 2023-12-05 NOTE — PATIENT INSTRUCTIONS
Continue current medications as prescribed. Continue to walk and stay active    Eat heart healthy diet    Follow-up with your PCP as scheduled. Follow-up with Dr. Sandra Bland or Callie DE OLIVEIRA in 1 year  as scheduled or sooner if need.

## 2024-11-19 DIAGNOSIS — I70.213 ATHEROSCLEROSIS OF NATIVE ARTERY OF BOTH LOWER EXTREMITIES WITH INTERMITTENT CLAUDICATION (HCC): ICD-10-CM

## 2024-11-19 DIAGNOSIS — I73.9 PAD (PERIPHERAL ARTERY DISEASE) (HCC): ICD-10-CM

## 2024-11-19 DIAGNOSIS — I10 PRIMARY HYPERTENSION: ICD-10-CM

## 2024-11-19 DIAGNOSIS — I73.9 PVD (PERIPHERAL VASCULAR DISEASE) (HCC): ICD-10-CM

## 2024-11-19 RX ORDER — EZETIMIBE 10 MG/1
TABLET ORAL
Qty: 30 TABLET | Refills: 0 | Status: SHIPPED | OUTPATIENT
Start: 2024-11-19

## 2024-11-19 RX ORDER — BISOPROLOL FUMARATE 5 MG/1
TABLET, FILM COATED ORAL
Qty: 30 TABLET | Refills: 0 | Status: SHIPPED | OUTPATIENT
Start: 2024-11-19

## 2024-11-19 RX ORDER — ATORVASTATIN CALCIUM 40 MG/1
TABLET, FILM COATED ORAL
Qty: 30 TABLET | Refills: 0 | Status: SHIPPED | OUTPATIENT
Start: 2024-11-19

## 2024-11-19 RX ORDER — CLOPIDOGREL BISULFATE 75 MG/1
TABLET ORAL
Qty: 30 TABLET | Refills: 0 | Status: SHIPPED | OUTPATIENT
Start: 2024-11-19

## 2024-12-05 ENCOUNTER — OFFICE VISIT (OUTPATIENT)
Dept: CARDIOLOGY CLINIC | Age: 74
End: 2024-12-05
Payer: MEDICARE

## 2024-12-05 VITALS
BODY MASS INDEX: 29.03 KG/M2 | HEART RATE: 69 BPM | DIASTOLIC BLOOD PRESSURE: 69 MMHG | WEIGHT: 207.4 LBS | SYSTOLIC BLOOD PRESSURE: 117 MMHG | HEIGHT: 71 IN

## 2024-12-05 DIAGNOSIS — I73.9 PVD (PERIPHERAL VASCULAR DISEASE) (HCC): ICD-10-CM

## 2024-12-05 DIAGNOSIS — I10 PRIMARY HYPERTENSION: ICD-10-CM

## 2024-12-05 DIAGNOSIS — I73.9 PAD (PERIPHERAL ARTERY DISEASE) (HCC): ICD-10-CM

## 2024-12-05 DIAGNOSIS — I70.213 ATHEROSCLEROSIS OF NATIVE ARTERY OF BOTH LOWER EXTREMITIES WITH INTERMITTENT CLAUDICATION (HCC): ICD-10-CM

## 2024-12-05 PROCEDURE — 3078F DIAST BP <80 MM HG: CPT | Performed by: INTERNAL MEDICINE

## 2024-12-05 PROCEDURE — G8484 FLU IMMUNIZE NO ADMIN: HCPCS | Performed by: INTERNAL MEDICINE

## 2024-12-05 PROCEDURE — 1123F ACP DISCUSS/DSCN MKR DOCD: CPT | Performed by: INTERNAL MEDICINE

## 2024-12-05 PROCEDURE — 1159F MED LIST DOCD IN RCRD: CPT | Performed by: INTERNAL MEDICINE

## 2024-12-05 PROCEDURE — 3074F SYST BP LT 130 MM HG: CPT | Performed by: INTERNAL MEDICINE

## 2024-12-05 PROCEDURE — 3017F COLORECTAL CA SCREEN DOC REV: CPT | Performed by: INTERNAL MEDICINE

## 2024-12-05 PROCEDURE — G8427 DOCREV CUR MEDS BY ELIG CLIN: HCPCS | Performed by: INTERNAL MEDICINE

## 2024-12-05 PROCEDURE — 99213 OFFICE O/P EST LOW 20 MIN: CPT | Performed by: INTERNAL MEDICINE

## 2024-12-05 PROCEDURE — 1036F TOBACCO NON-USER: CPT | Performed by: INTERNAL MEDICINE

## 2024-12-05 PROCEDURE — G8419 CALC BMI OUT NRM PARAM NOF/U: HCPCS | Performed by: INTERNAL MEDICINE

## 2024-12-05 RX ORDER — ATORVASTATIN CALCIUM 40 MG/1
TABLET, FILM COATED ORAL
Qty: 90 TABLET | Refills: 3 | Status: SHIPPED | OUTPATIENT
Start: 2024-12-05

## 2024-12-05 RX ORDER — BISOPROLOL FUMARATE 5 MG/1
TABLET, FILM COATED ORAL
Qty: 90 TABLET | Refills: 3 | Status: SHIPPED | OUTPATIENT
Start: 2024-12-05

## 2024-12-05 RX ORDER — EZETIMIBE 10 MG/1
TABLET ORAL
Qty: 90 TABLET | Refills: 3 | Status: SHIPPED | OUTPATIENT
Start: 2024-12-05

## 2024-12-05 RX ORDER — CLOPIDOGREL BISULFATE 75 MG/1
TABLET ORAL
Qty: 90 TABLET | Refills: 3 | Status: SHIPPED | OUTPATIENT
Start: 2024-12-05

## 2024-12-05 NOTE — PATIENT INSTRUCTIONS
Your nurses today were FRANCE Song and GEORGE Salinas  Your provider today was Dr. Peña  Phone number: [phone]      You may receive a survey regarding the care you received during your visit.  Your input is valuable to us.  We encourage you to complete and return your survey.  We hope you will choose us in the future for your healthcare needs.

## 2024-12-05 NOTE — PROGRESS NOTES
[facility] [place] [facility]  [facility] CARDIOLOGY  [address]  [address]  [place] [address]  Dept: [phone]  Dept Fax: [phone]  Loc: [phone]    Visit Date: 12/5/2024    Mr. Galeano is a 74 y.o. male  who presented for:  Chief Complaint   Patient presents with    1 Year Follow Up       HPI:     History of Present Illness  The patient is a 74-year-old male who presents for evaluation of calf cramping.    He reports experiencing calf cramping after walking a distance of 50 to 100 yards, necessitating a rest period. He has an upcoming appointment at [facility], although the exact date is currently unknown. He denies the presence of any ulcers or wounds and confirms adherence to his prescribed medication regimen. He also denies any instances of bleeding. His cholesterol levels are being monitored and he is on medication for the same.    No chest pain, angina, CLEARY, orthopnea, PND, sob at rest, palpitations, LE edema, or syncope.    Denies leg pain, ulcers, color change, temperature change, or change in walking distance.    No issues with medications.   No bleeding.         SOCIAL HISTORY  He smokes 1 joint a day in the evening when he is relaxing.         Current Outpatient Medications:     clopidogrel (PLAVIX) 75 MG tablet, TAKE 1 TABLET BY MOUTH DAILY, Disp: 30 tablet, Rfl: 0    bisoprolol (ZEBETA) 5 MG tablet, TAKE 1 TABLET BY MOUTH DAILY, Disp: 30 tablet, Rfl: 0    atorvastatin (LIPITOR) 40 MG tablet, TAKE 1 TABLET BY MOUTH DAILY, Disp: 30 tablet, Rfl: 0    ezetimibe (ZETIA) 10 MG tablet, TAKE 1 TABLET BY MOUTH DAILY, Disp: 30 tablet, Rfl: 0    aspirin 81 MG EC tablet, Take 1 tablet by mouth daily, Disp: , Rfl:     magnesium oxide (MAG-OX) 400 MG tablet, Take 1 tablet by mouth daily, Disp: , Rfl:     zinc sulfate (ZINCATE) 220 (50 Zn) MG capsule, Take 1 capsule by mouth daily, Disp: , Rfl:     ascorbic acid (VITAMIN C) 500 MG tablet, Take 1 tablet by mouth daily, Disp: , Rfl:

## 2025-03-18 ENCOUNTER — TELEPHONE (OUTPATIENT)
Dept: CARDIOLOGY CLINIC | Age: 75
End: 2025-03-18

## 2025-03-18 NOTE — TELEPHONE ENCOUNTER
Pre op Risk Assessment    Procedure Exostectomy of toes  Physician Stefan Berry  Date of surgery/procedure TBD     Last OV 12/05/24  Last Stress 6/7/19  Last Echo 1/19/17 ([facility])  Last Cath   Last Stent Peripheral angioplasty  Is patient on blood thinners Plavix and ASA   Hold Meds/how many days 5    [phone]

## 2025-08-20 ENCOUNTER — APPOINTMENT (OUTPATIENT)
Dept: CT IMAGING | Age: 75
DRG: 815 | End: 2025-08-20
Payer: MEDICARE

## 2025-08-20 ENCOUNTER — APPOINTMENT (OUTPATIENT)
Dept: ULTRASOUND IMAGING | Age: 75
DRG: 815 | End: 2025-08-20
Payer: MEDICARE

## 2025-08-20 ENCOUNTER — HOSPITAL ENCOUNTER (INPATIENT)
Age: 75
LOS: 1 days | Discharge: HOME | DRG: 815 | End: 2025-08-24
Attending: STUDENT IN AN ORGANIZED HEALTH CARE EDUCATION/TRAINING PROGRAM | Admitting: NURSE PRACTITIONER
Payer: MEDICARE

## 2025-08-20 DIAGNOSIS — R53.81 MALAISE: ICD-10-CM

## 2025-08-20 DIAGNOSIS — R79.89 ELEVATED LFTS: ICD-10-CM

## 2025-08-20 DIAGNOSIS — D72.829 LEUKOCYTOSIS, UNSPECIFIED TYPE: Primary | ICD-10-CM

## 2025-08-20 DIAGNOSIS — R63.0 ANOREXIA: ICD-10-CM

## 2025-08-20 DIAGNOSIS — R16.0 LIVER MASS: ICD-10-CM

## 2025-08-20 PROBLEM — E86.1 HYPOVOLEMIA: Status: ACTIVE | Noted: 2025-08-20

## 2025-08-20 LAB
ALBUMIN SERPL BCG-MCNC: 3.2 G/DL (ref 3.4–4.9)
ALP SERPL-CCNC: 486 U/L (ref 40–129)
ALT SERPL W/O P-5'-P-CCNC: 39 U/L (ref 10–50)
ANION GAP SERPL CALC-SCNC: 9 MEQ/L (ref 8–16)
AST SERPL-CCNC: 35 U/L (ref 10–50)
B PERT DNA NPH QL NAA+PROBE: NOT DETECTED
BASOPHILS ABSOLUTE: 0 THOU/MM3 (ref 0–0.1)
BASOPHILS NFR BLD AUTO: 0.2 %
BILIRUB CONJ SERPL-MCNC: 1 MG/DL (ref 0–0.2)
BILIRUB SERPL-MCNC: 1.1 MG/DL (ref 0.3–1.2)
BILIRUB UR QL STRIP.AUTO: NEGATIVE
BORDETELLA PARAPERTUSSIS BY PCR: NOT DETECTED
BUN SERPL-MCNC: 8 MG/DL (ref 8–23)
C PNEUM DNA SPEC QL NAA+PROBE: NOT DETECTED
CALCIUM SERPL-MCNC: 8.8 MG/DL (ref 8.5–10.5)
CHARACTER UR: CLEAR
CHLORIDE SERPL-SCNC: 100 MEQ/L (ref 98–111)
CO2 SERPL-SCNC: 26 MEQ/L (ref 22–29)
COLOR, UA: YELLOW
CREAT SERPL-MCNC: 0.6 MG/DL (ref 0.7–1.2)
CRP SERPL-MCNC: 1.89 MG/DL (ref 0–0.5)
DEPRECATED RDW RBC AUTO: 47.7 FL (ref 35–45)
EKG ATRIAL RATE: 71 BPM
EKG P AXIS: 25 DEGREES
EKG P-R INTERVAL: 130 MS
EKG Q-T INTERVAL: 416 MS
EKG QRS DURATION: 98 MS
EKG QTC CALCULATION (BAZETT): 452 MS
EKG R AXIS: 61 DEGREES
EKG T AXIS: 72 DEGREES
EKG VENTRICULAR RATE: 71 BPM
EOSINOPHIL NFR BLD AUTO: 47.5 %
EOSINOPHILS ABSOLUTE: 10.7 THOU/MM3 (ref 0–0.4)
ERYTHROCYTE [DISTWIDTH] IN BLOOD BY AUTOMATED COUNT: 13.8 % (ref 11.5–14.5)
FLUAV RNA NPH QL NAA+PROBE: NOT DETECTED
FLUBV RNA NPH QL NAA+PROBE: NOT DETECTED
GFR SERPL CREATININE-BSD FRML MDRD: > 90 ML/MIN/1.73M2
GGT SERPL-CCNC: 217 U/L (ref 8–69)
GLUCOSE SERPL-MCNC: 99 MG/DL (ref 74–109)
GLUCOSE UR QL STRIP.AUTO: NEGATIVE MG/DL
HADV DNA NPH QL NAA+PROBE: NOT DETECTED
HCOV 229E RNA SPEC QL NAA+PROBE: NOT DETECTED
HCOV HKU1 RNA SPEC QL NAA+PROBE: NOT DETECTED
HCOV NL63 RNA SPEC QL NAA+PROBE: NOT DETECTED
HCOV OC43 RNA SPEC QL NAA+PROBE: NOT DETECTED
HCT VFR BLD AUTO: 35.7 % (ref 42–52)
HGB BLD-MCNC: 12.1 GM/DL (ref 14–18)
HGB UR QL STRIP.AUTO: NEGATIVE
HMPV RNA NPH QL NAA+PROBE: NOT DETECTED
HPIV1 RNA NPH QL NAA+PROBE: NOT DETECTED
HPIV2 RNA NPH QL NAA+PROBE: NOT DETECTED
HPIV3 RNA NPH QL NAA+PROBE: NOT DETECTED
HPIV4 RNA NPH QL NAA+PROBE: NOT DETECTED
IMM GRANULOCYTES # BLD AUTO: 0.17 THOU/MM3 (ref 0–0.07)
IMM GRANULOCYTES NFR BLD AUTO: 0.8 %
KETONES UR QL STRIP.AUTO: NEGATIVE
LACTIC ACID, SEPSIS: 1.1 MMOL/L (ref 0.5–1.9)
LACTIC ACID, SEPSIS: 1.3 MMOL/L (ref 0.5–1.9)
LIPASE SERPL-CCNC: 18 U/L (ref 13–60)
LYMPHOCYTES ABSOLUTE: 1.4 THOU/MM3 (ref 1–4.8)
LYMPHOCYTES NFR BLD AUTO: 6 %
M PNEUMO DNA SPEC QL NAA+PROBE: NOT DETECTED
MAGNESIUM SERPL-MCNC: 2.1 MG/DL (ref 1.6–2.6)
MCH RBC QN AUTO: 31.9 PG (ref 26–33)
MCHC RBC AUTO-ENTMCNC: 33.9 GM/DL (ref 32.2–35.5)
MCV RBC AUTO: 94.2 FL (ref 80–94)
MONOCYTES ABSOLUTE: 0.5 THOU/MM3 (ref 0.4–1.3)
MONOCYTES NFR BLD AUTO: 2.1 %
NEUTROPHILS ABSOLUTE: 9.8 THOU/MM3 (ref 1.8–7.7)
NEUTROPHILS NFR BLD AUTO: 43.4 %
NITRITE UR QL STRIP: NEGATIVE
NRBC BLD AUTO-RTO: 0 /100 WBC
OSMOLALITY SERPL CALC.SUM OF ELEC: 268.5 MOSMOL/KG (ref 275–300)
PATHOLOGIST REVIEW: ABNORMAL
PH UR STRIP.AUTO: 7 [PH] (ref 5–9)
PLATELET # BLD AUTO: 207 THOU/MM3 (ref 130–400)
PLATELET BLD QL SMEAR: ADEQUATE
PMV BLD AUTO: 10 FL (ref 9.4–12.4)
POTASSIUM SERPL-SCNC: 4.2 MEQ/L (ref 3.5–5.2)
PROCALCITONIN SERPL IA-MCNC: 0.1 NG/ML (ref 0.01–0.09)
PROT SERPL-MCNC: 5.8 G/DL (ref 6.4–8.3)
PROT UR STRIP.AUTO-MCNC: NEGATIVE MG/DL
RBC # BLD AUTO: 3.79 MILL/MM3 (ref 4.7–6.1)
RSV RNA NPH QL NAA+PROBE: NOT DETECTED
RV+EV RNA SPEC QL NAA+PROBE: NOT DETECTED
SARS-COV-2 RNA NPH QL NAA+NON-PROBE: NOT DETECTED
SCAN OF BLOOD SMEAR: NORMAL
SODIUM SERPL-SCNC: 135 MEQ/L (ref 135–145)
SP GR UR REFRACT.AUTO: < 1.005 (ref 1–1.03)
TROPONIN, HIGH SENSITIVITY: 11 NG/L (ref 0–12)
TROPONIN, HIGH SENSITIVITY: 17 NG/L (ref 0–12)
UROBILINOGEN, URINE: 0.2 EU/DL (ref 0–1)
WBC # BLD AUTO: 22.5 THOU/MM3 (ref 4.8–10.8)
WBC #/AREA URNS HPF: NEGATIVE /[HPF]

## 2025-08-20 PROCEDURE — G0378 HOSPITAL OBSERVATION PER HR: HCPCS

## 2025-08-20 PROCEDURE — 81003 URINALYSIS AUTO W/O SCOPE: CPT

## 2025-08-20 PROCEDURE — 82248 BILIRUBIN DIRECT: CPT

## 2025-08-20 PROCEDURE — 6360000004 HC RX CONTRAST MEDICATION: Performed by: STUDENT IN AN ORGANIZED HEALTH CARE EDUCATION/TRAINING PROGRAM

## 2025-08-20 PROCEDURE — 6360000002 HC RX W HCPCS

## 2025-08-20 PROCEDURE — 70450 CT HEAD/BRAIN W/O DYE: CPT

## 2025-08-20 PROCEDURE — 36415 COLL VENOUS BLD VENIPUNCTURE: CPT

## 2025-08-20 PROCEDURE — 83735 ASSAY OF MAGNESIUM: CPT

## 2025-08-20 PROCEDURE — 96374 THER/PROPH/DIAG INJ IV PUSH: CPT

## 2025-08-20 PROCEDURE — 2500000003 HC RX 250 WO HCPCS

## 2025-08-20 PROCEDURE — 2580000003 HC RX 258: Performed by: STUDENT IN AN ORGANIZED HEALTH CARE EDUCATION/TRAINING PROGRAM

## 2025-08-20 PROCEDURE — 85025 COMPLETE CBC W/AUTO DIFF WBC: CPT

## 2025-08-20 PROCEDURE — 84145 PROCALCITONIN (PCT): CPT

## 2025-08-20 PROCEDURE — 76705 ECHO EXAM OF ABDOMEN: CPT

## 2025-08-20 PROCEDURE — 6360000002 HC RX W HCPCS: Performed by: STUDENT IN AN ORGANIZED HEALTH CARE EDUCATION/TRAINING PROGRAM

## 2025-08-20 PROCEDURE — 84484 ASSAY OF TROPONIN QUANT: CPT

## 2025-08-20 PROCEDURE — 96372 THER/PROPH/DIAG INJ SC/IM: CPT

## 2025-08-20 PROCEDURE — 2500000003 HC RX 250 WO HCPCS: Performed by: STUDENT IN AN ORGANIZED HEALTH CARE EDUCATION/TRAINING PROGRAM

## 2025-08-20 PROCEDURE — 6370000000 HC RX 637 (ALT 250 FOR IP)

## 2025-08-20 PROCEDURE — 80053 COMPREHEN METABOLIC PANEL: CPT

## 2025-08-20 PROCEDURE — 82977 ASSAY OF GGT: CPT

## 2025-08-20 PROCEDURE — 71275 CT ANGIOGRAPHY CHEST: CPT

## 2025-08-20 PROCEDURE — 99223 1ST HOSP IP/OBS HIGH 75: CPT

## 2025-08-20 PROCEDURE — 87040 BLOOD CULTURE FOR BACTERIA: CPT

## 2025-08-20 PROCEDURE — 2580000003 HC RX 258

## 2025-08-20 PROCEDURE — 96375 TX/PRO/DX INJ NEW DRUG ADDON: CPT

## 2025-08-20 PROCEDURE — 0202U NFCT DS 22 TRGT SARS-COV-2: CPT

## 2025-08-20 PROCEDURE — 86140 C-REACTIVE PROTEIN: CPT

## 2025-08-20 PROCEDURE — 83690 ASSAY OF LIPASE: CPT

## 2025-08-20 PROCEDURE — 96365 THER/PROPH/DIAG IV INF INIT: CPT

## 2025-08-20 PROCEDURE — 99285 EMERGENCY DEPT VISIT HI MDM: CPT

## 2025-08-20 PROCEDURE — 74177 CT ABD & PELVIS W/CONTRAST: CPT

## 2025-08-20 PROCEDURE — 83605 ASSAY OF LACTIC ACID: CPT

## 2025-08-20 PROCEDURE — 93005 ELECTROCARDIOGRAM TRACING: CPT | Performed by: STUDENT IN AN ORGANIZED HEALTH CARE EDUCATION/TRAINING PROGRAM

## 2025-08-20 RX ORDER — FOLIC ACID 1 MG/1
1000 TABLET ORAL DAILY
COMMUNITY
Start: 2025-08-12

## 2025-08-20 RX ORDER — LANOLIN ALCOHOL/MO/W.PET/CERES
100 CREAM (GRAM) TOPICAL DAILY
COMMUNITY
Start: 2025-08-12

## 2025-08-20 RX ORDER — ATORVASTATIN CALCIUM 40 MG/1
40 TABLET, FILM COATED ORAL NIGHTLY
Status: DISCONTINUED | OUTPATIENT
Start: 2025-08-20 | End: 2025-08-24 | Stop reason: HOSPADM

## 2025-08-20 RX ORDER — ONDANSETRON 2 MG/ML
4 INJECTION INTRAMUSCULAR; INTRAVENOUS EVERY 6 HOURS PRN
Status: DISCONTINUED | OUTPATIENT
Start: 2025-08-20 | End: 2025-08-24 | Stop reason: HOSPADM

## 2025-08-20 RX ORDER — IOPAMIDOL 755 MG/ML
80 INJECTION, SOLUTION INTRAVASCULAR
Status: COMPLETED | OUTPATIENT
Start: 2025-08-20 | End: 2025-08-20

## 2025-08-20 RX ORDER — POTASSIUM CHLORIDE 7.45 MG/ML
10 INJECTION INTRAVENOUS PRN
Status: DISCONTINUED | OUTPATIENT
Start: 2025-08-20 | End: 2025-08-24 | Stop reason: HOSPADM

## 2025-08-20 RX ORDER — AMLODIPINE BESYLATE 10 MG/1
10 TABLET ORAL DAILY
Status: DISCONTINUED | OUTPATIENT
Start: 2025-08-20 | End: 2025-08-24 | Stop reason: HOSPADM

## 2025-08-20 RX ORDER — POLYETHYLENE GLYCOL 3350 17 G/17G
17 POWDER, FOR SOLUTION ORAL DAILY PRN
Status: DISCONTINUED | OUTPATIENT
Start: 2025-08-20 | End: 2025-08-24 | Stop reason: HOSPADM

## 2025-08-20 RX ORDER — SODIUM CHLORIDE 0.9 % (FLUSH) 0.9 %
5-40 SYRINGE (ML) INJECTION PRN
Status: DISCONTINUED | OUTPATIENT
Start: 2025-08-20 | End: 2025-08-24 | Stop reason: HOSPADM

## 2025-08-20 RX ORDER — ONDANSETRON 4 MG/1
4 TABLET, ORALLY DISINTEGRATING ORAL EVERY 8 HOURS PRN
Status: DISCONTINUED | OUTPATIENT
Start: 2025-08-20 | End: 2025-08-24 | Stop reason: HOSPADM

## 2025-08-20 RX ORDER — ENOXAPARIN SODIUM 100 MG/ML
40 INJECTION SUBCUTANEOUS EVERY 24 HOURS
Status: DISCONTINUED | OUTPATIENT
Start: 2025-08-20 | End: 2025-08-24 | Stop reason: HOSPADM

## 2025-08-20 RX ORDER — SODIUM CHLORIDE 9 MG/ML
INJECTION, SOLUTION INTRAVENOUS CONTINUOUS
Status: ACTIVE | OUTPATIENT
Start: 2025-08-20 | End: 2025-08-21

## 2025-08-20 RX ORDER — SODIUM CHLORIDE, SODIUM LACTATE, POTASSIUM CHLORIDE, AND CALCIUM CHLORIDE .6; .31; .03; .02 G/100ML; G/100ML; G/100ML; G/100ML
1000 INJECTION, SOLUTION INTRAVENOUS ONCE
Status: COMPLETED | OUTPATIENT
Start: 2025-08-20 | End: 2025-08-20

## 2025-08-20 RX ORDER — MAGNESIUM SULFATE IN WATER 40 MG/ML
2000 INJECTION, SOLUTION INTRAVENOUS PRN
Status: DISCONTINUED | OUTPATIENT
Start: 2025-08-20 | End: 2025-08-24 | Stop reason: HOSPADM

## 2025-08-20 RX ORDER — ACETAMINOPHEN 650 MG/1
650 SUPPOSITORY RECTAL EVERY 6 HOURS PRN
Status: DISCONTINUED | OUTPATIENT
Start: 2025-08-20 | End: 2025-08-24 | Stop reason: HOSPADM

## 2025-08-20 RX ORDER — SODIUM CHLORIDE 0.9 % (FLUSH) 0.9 %
5-40 SYRINGE (ML) INJECTION EVERY 12 HOURS SCHEDULED
Status: DISCONTINUED | OUTPATIENT
Start: 2025-08-20 | End: 2025-08-24 | Stop reason: HOSPADM

## 2025-08-20 RX ORDER — DOXAZOSIN 4 MG/1
4 TABLET ORAL DAILY
Status: DISCONTINUED | OUTPATIENT
Start: 2025-08-20 | End: 2025-08-24 | Stop reason: HOSPADM

## 2025-08-20 RX ORDER — METRONIDAZOLE 500 MG/100ML
500 INJECTION, SOLUTION INTRAVENOUS ONCE
Status: COMPLETED | OUTPATIENT
Start: 2025-08-20 | End: 2025-08-20

## 2025-08-20 RX ORDER — ASPIRIN 81 MG/1
81 TABLET ORAL DAILY
Status: DISCONTINUED | OUTPATIENT
Start: 2025-08-21 | End: 2025-08-24 | Stop reason: HOSPADM

## 2025-08-20 RX ORDER — CLOPIDOGREL BISULFATE 75 MG/1
75 TABLET ORAL DAILY
Status: DISCONTINUED | OUTPATIENT
Start: 2025-08-20 | End: 2025-08-24 | Stop reason: HOSPADM

## 2025-08-20 RX ORDER — EZETIMIBE 10 MG/1
10 TABLET ORAL NIGHTLY
Status: DISCONTINUED | OUTPATIENT
Start: 2025-08-20 | End: 2025-08-24 | Stop reason: HOSPADM

## 2025-08-20 RX ORDER — HYDROXYZINE HYDROCHLORIDE 25 MG/1
25 TABLET, FILM COATED ORAL EVERY 6 HOURS PRN
Status: ON HOLD | COMMUNITY
Start: 2025-08-12 | End: 2025-08-24 | Stop reason: HOSPADM

## 2025-08-20 RX ORDER — SODIUM CHLORIDE 9 MG/ML
INJECTION, SOLUTION INTRAVENOUS PRN
Status: DISCONTINUED | OUTPATIENT
Start: 2025-08-20 | End: 2025-08-24 | Stop reason: HOSPADM

## 2025-08-20 RX ORDER — ACETAMINOPHEN 325 MG/1
650 TABLET ORAL EVERY 6 HOURS PRN
Status: DISCONTINUED | OUTPATIENT
Start: 2025-08-20 | End: 2025-08-24 | Stop reason: HOSPADM

## 2025-08-20 RX ORDER — LISINOPRIL 40 MG/1
40 TABLET ORAL DAILY
Status: DISCONTINUED | OUTPATIENT
Start: 2025-08-20 | End: 2025-08-24 | Stop reason: HOSPADM

## 2025-08-20 RX ORDER — POTASSIUM CHLORIDE 1500 MG/1
40 TABLET, EXTENDED RELEASE ORAL PRN
Status: DISCONTINUED | OUTPATIENT
Start: 2025-08-20 | End: 2025-08-24 | Stop reason: HOSPADM

## 2025-08-20 RX ADMIN — METRONIDAZOLE 500 MG: 500 INJECTION, SOLUTION INTRAVENOUS at 13:34

## 2025-08-20 RX ADMIN — DOXAZOSIN 4 MG: 4 TABLET ORAL at 20:37

## 2025-08-20 RX ADMIN — ENOXAPARIN SODIUM 40 MG: 100 INJECTION SUBCUTANEOUS at 18:21

## 2025-08-20 RX ADMIN — SODIUM CHLORIDE, SODIUM LACTATE, POTASSIUM CHLORIDE, AND CALCIUM CHLORIDE 1000 ML: .6; .31; .03; .02 INJECTION, SOLUTION INTRAVENOUS at 13:17

## 2025-08-20 RX ADMIN — AZITHROMYCIN MONOHYDRATE 500 MG: 500 INJECTION, POWDER, LYOPHILIZED, FOR SOLUTION INTRAVENOUS at 16:43

## 2025-08-20 RX ADMIN — SODIUM CHLORIDE: 0.9 INJECTION, SOLUTION INTRAVENOUS at 17:55

## 2025-08-20 RX ADMIN — LISINOPRIL 40 MG: 40 TABLET ORAL at 20:37

## 2025-08-20 RX ADMIN — SODIUM CHLORIDE, PRESERVATIVE FREE 10 ML: 5 INJECTION INTRAVENOUS at 19:43

## 2025-08-20 RX ADMIN — CLOPIDOGREL BISULFATE 75 MG: 75 TABLET, FILM COATED ORAL at 20:43

## 2025-08-20 RX ADMIN — WATER 2000 MG: 1 INJECTION INTRAMUSCULAR; INTRAVENOUS; SUBCUTANEOUS at 13:21

## 2025-08-20 RX ADMIN — IOPAMIDOL 80 ML: 755 INJECTION, SOLUTION INTRAVENOUS at 12:52

## 2025-08-20 RX ADMIN — AMLODIPINE BESYLATE 10 MG: 10 TABLET ORAL at 20:37

## 2025-08-20 ASSESSMENT — PAIN - FUNCTIONAL ASSESSMENT: PAIN_FUNCTIONAL_ASSESSMENT: 0-10

## 2025-08-20 ASSESSMENT — PAIN SCALES - GENERAL
PAINLEVEL_OUTOF10: 0

## 2025-08-21 ENCOUNTER — APPOINTMENT (OUTPATIENT)
Dept: ULTRASOUND IMAGING | Age: 75
DRG: 815 | End: 2025-08-21
Payer: MEDICARE

## 2025-08-21 ENCOUNTER — APPOINTMENT (OUTPATIENT)
Dept: NUCLEAR MEDICINE | Age: 75
DRG: 815 | End: 2025-08-21
Payer: MEDICARE

## 2025-08-21 LAB
A1AT SERPL-MCNC: 181 MG/DL (ref 90–200)
ALBUMIN SERPL BCG-MCNC: 2.7 G/DL (ref 3.4–4.9)
ALP SERPL-CCNC: 395 U/L (ref 40–129)
ALT SERPL W/O P-5'-P-CCNC: 28 U/L (ref 10–50)
ANION GAP SERPL CALC-SCNC: 10 MEQ/L (ref 8–16)
AST SERPL-CCNC: 21 U/L (ref 10–50)
BASOPHILS ABSOLUTE: 0 THOU/MM3 (ref 0–0.1)
BASOPHILS NFR BLD AUTO: 0.2 %
BILIRUB CONJ SERPL-MCNC: 0.6 MG/DL (ref 0–0.2)
BILIRUB SERPL-MCNC: 0.8 MG/DL (ref 0.3–1.2)
BUN SERPL-MCNC: 5 MG/DL (ref 8–23)
CALCIUM SERPL-MCNC: 8.5 MG/DL (ref 8.5–10.5)
CHLORIDE SERPL-SCNC: 102 MEQ/L (ref 98–111)
CO2 SERPL-SCNC: 22 MEQ/L (ref 22–29)
CREAT SERPL-MCNC: 0.5 MG/DL (ref 0.7–1.2)
DEPRECATED RDW RBC AUTO: 48.6 FL (ref 35–45)
EOSINOPHIL NFR BLD AUTO: 52.8 %
EOSINOPHILS ABSOLUTE: 11.6 THOU/MM3 (ref 0–0.4)
ERYTHROCYTE [DISTWIDTH] IN BLOOD BY AUTOMATED COUNT: 13.7 % (ref 11.5–14.5)
FERRITIN SERPL IA-MCNC: 796 NG/ML (ref 30–400)
GFR SERPL CREATININE-BSD FRML MDRD: > 90 ML/MIN/1.73M2
GLUCOSE SERPL-MCNC: 86 MG/DL (ref 74–109)
HAV IGM SER QL: NONREACTIVE
HBV CORE IGM SERPL QL IA: NONREACTIVE
HBV SURFACE AG SERPL QL IA: NONREACTIVE
HCT VFR BLD AUTO: 33.5 % (ref 42–52)
HCV IGG SERPL QL IA: NONREACTIVE
HGB BLD-MCNC: 11.4 GM/DL (ref 14–18)
IMM GRANULOCYTES # BLD AUTO: 0.16 THOU/MM3 (ref 0–0.07)
IMM GRANULOCYTES NFR BLD AUTO: 0.7 %
INR PPP: 1.48 (ref 0.85–1.13)
IRON SATN MFR SERPL: 53 % (ref 20–50)
IRON SERPL-MCNC: 61 UG/DL (ref 61–157)
LYMPHOCYTES ABSOLUTE: 1.4 THOU/MM3 (ref 1–4.8)
LYMPHOCYTES NFR BLD AUTO: 6.2 %
MCH RBC QN AUTO: 32.5 PG (ref 26–33)
MCHC RBC AUTO-ENTMCNC: 34 GM/DL (ref 32.2–35.5)
MCV RBC AUTO: 95.4 FL (ref 80–94)
MONOCYTES ABSOLUTE: 0.5 THOU/MM3 (ref 0.4–1.3)
MONOCYTES NFR BLD AUTO: 2.3 %
NEUTROPHILS ABSOLUTE: 8.3 THOU/MM3 (ref 1.8–7.7)
NEUTROPHILS NFR BLD AUTO: 37.8 %
NRBC BLD AUTO-RTO: 0 /100 WBC
PLATELET # BLD AUTO: 207 THOU/MM3 (ref 130–400)
PLATELET BLD QL SMEAR: ADEQUATE
PMV BLD AUTO: 9.6 FL (ref 9.4–12.4)
POTASSIUM SERPL-SCNC: 4 MEQ/L (ref 3.5–5.2)
PROT SERPL-MCNC: 4.9 G/DL (ref 6.4–8.3)
PROTHROMBIN TIME: 17.3 SECONDS (ref 10–13.5)
RBC # BLD AUTO: 3.51 MILL/MM3 (ref 4.7–6.1)
REVIEWED BY: NORMAL
SCAN OF BLOOD SMEAR: NORMAL
SMEAR REVIEW: NORMAL
SODIUM SERPL-SCNC: 134 MEQ/L (ref 135–145)
TIBC SERPL-MCNC: 115 UG/DL (ref 171–450)
WBC # BLD AUTO: 21.9 THOU/MM3 (ref 4.8–10.8)

## 2025-08-21 PROCEDURE — 82105 ALPHA-FETOPROTEIN SERUM: CPT

## 2025-08-21 PROCEDURE — 96372 THER/PROPH/DIAG INJ SC/IM: CPT

## 2025-08-21 PROCEDURE — 80074 ACUTE HEPATITIS PANEL: CPT

## 2025-08-21 PROCEDURE — 6360000002 HC RX W HCPCS

## 2025-08-21 PROCEDURE — 93975 VASCULAR STUDY: CPT

## 2025-08-21 PROCEDURE — 82103 ALPHA-1-ANTITRYPSIN TOTAL: CPT

## 2025-08-21 PROCEDURE — 36415 COLL VENOUS BLD VENIPUNCTURE: CPT

## 2025-08-21 PROCEDURE — 76981 USE PARENCHYMA: CPT

## 2025-08-21 PROCEDURE — 86376 MICROSOMAL ANTIBODY EACH: CPT

## 2025-08-21 PROCEDURE — 51798 US URINE CAPACITY MEASURE: CPT

## 2025-08-21 PROCEDURE — 85025 COMPLETE CBC W/AUTO DIFF WBC: CPT

## 2025-08-21 PROCEDURE — 80048 BASIC METABOLIC PNL TOTAL CA: CPT

## 2025-08-21 PROCEDURE — 83516 IMMUNOASSAY NONANTIBODY: CPT

## 2025-08-21 PROCEDURE — 94669 MECHANICAL CHEST WALL OSCILL: CPT

## 2025-08-21 PROCEDURE — 80076 HEPATIC FUNCTION PANEL: CPT

## 2025-08-21 PROCEDURE — 51701 INSERT BLADDER CATHETER: CPT

## 2025-08-21 PROCEDURE — 92610 EVALUATE SWALLOWING FUNCTION: CPT

## 2025-08-21 PROCEDURE — 6370000000 HC RX 637 (ALT 250 FOR IP)

## 2025-08-21 PROCEDURE — 92526 ORAL FUNCTION THERAPY: CPT

## 2025-08-21 PROCEDURE — 85610 PROTHROMBIN TIME: CPT

## 2025-08-21 PROCEDURE — 82390 ASSAY OF CERULOPLASMIN: CPT

## 2025-08-21 PROCEDURE — 86038 ANTINUCLEAR ANTIBODIES: CPT

## 2025-08-21 PROCEDURE — 82728 ASSAY OF FERRITIN: CPT

## 2025-08-21 PROCEDURE — 83550 IRON BINDING TEST: CPT

## 2025-08-21 PROCEDURE — G0378 HOSPITAL OBSERVATION PER HR: HCPCS

## 2025-08-21 PROCEDURE — 83540 ASSAY OF IRON: CPT

## 2025-08-21 PROCEDURE — 76705 ECHO EXAM OF ABDOMEN: CPT

## 2025-08-21 PROCEDURE — 2500000003 HC RX 250 WO HCPCS

## 2025-08-21 PROCEDURE — 99233 SBSQ HOSP IP/OBS HIGH 50: CPT | Performed by: NURSE PRACTITIONER

## 2025-08-21 RX ADMIN — ASPIRIN 81 MG: 81 TABLET, COATED ORAL at 09:31

## 2025-08-21 RX ADMIN — SODIUM CHLORIDE, PRESERVATIVE FREE 10 ML: 5 INJECTION INTRAVENOUS at 20:47

## 2025-08-21 RX ADMIN — AMLODIPINE BESYLATE 10 MG: 10 TABLET ORAL at 09:31

## 2025-08-21 RX ADMIN — ENOXAPARIN SODIUM 40 MG: 100 INJECTION SUBCUTANEOUS at 20:47

## 2025-08-21 RX ADMIN — DOXAZOSIN 4 MG: 4 TABLET ORAL at 09:31

## 2025-08-21 RX ADMIN — CLOPIDOGREL BISULFATE 75 MG: 75 TABLET, FILM COATED ORAL at 09:31

## 2025-08-21 RX ADMIN — SODIUM CHLORIDE, PRESERVATIVE FREE 10 ML: 5 INJECTION INTRAVENOUS at 09:31

## 2025-08-21 RX ADMIN — LISINOPRIL 40 MG: 40 TABLET ORAL at 09:31

## 2025-08-21 ASSESSMENT — PAIN SCALES - GENERAL
PAINLEVEL_OUTOF10: 0

## 2025-08-22 ENCOUNTER — APPOINTMENT (OUTPATIENT)
Dept: GENERAL RADIOLOGY | Age: 75
DRG: 815 | End: 2025-08-22
Payer: MEDICARE

## 2025-08-22 ENCOUNTER — APPOINTMENT (OUTPATIENT)
Dept: MRI IMAGING | Age: 75
DRG: 815 | End: 2025-08-22
Payer: MEDICARE

## 2025-08-22 LAB
AFP SERPL-MCNC: < 1.8 UG/L
ALBUMIN SERPL BCG-MCNC: 2.8 G/DL (ref 3.4–4.9)
ALP SERPL-CCNC: 379 U/L (ref 40–129)
ALT SERPL W/O P-5'-P-CCNC: 25 U/L (ref 10–50)
ANION GAP SERPL CALC-SCNC: 6 MEQ/L (ref 8–16)
AST SERPL-CCNC: 28 U/L (ref 10–50)
BASOPHILS ABSOLUTE: 0.1 THOU/MM3 (ref 0–0.1)
BASOPHILS NFR BLD AUTO: 0.3 %
BILIRUB CONJ SERPL-MCNC: 0.8 MG/DL (ref 0–0.2)
BILIRUB SERPL-MCNC: 0.8 MG/DL (ref 0.3–1.2)
BUN SERPL-MCNC: 4 MG/DL (ref 8–23)
CALCIUM SERPL-MCNC: 8.2 MG/DL (ref 8.5–10.5)
CHLORIDE SERPL-SCNC: 103 MEQ/L (ref 98–111)
CO2 SERPL-SCNC: 26 MEQ/L (ref 22–29)
CREAT SERPL-MCNC: 0.5 MG/DL (ref 0.7–1.2)
DEPRECATED RDW RBC AUTO: 47.7 FL (ref 35–45)
EOSINOPHIL NFR BLD AUTO: 56.1 %
EOSINOPHILS ABSOLUTE: 14.6 THOU/MM3 (ref 0–0.4)
ERYTHROCYTE [DISTWIDTH] IN BLOOD BY AUTOMATED COUNT: 13.7 % (ref 11.5–14.5)
GFR SERPL CREATININE-BSD FRML MDRD: > 90 ML/MIN/1.73M2
GLUCOSE SERPL-MCNC: 85 MG/DL (ref 74–109)
HCT VFR BLD AUTO: 32.9 % (ref 42–52)
HGB BLD-MCNC: 11.2 GM/DL (ref 14–18)
IGE SERPL-ACNC: 1404 IU/ML (ref 0–100)
IMM GRANULOCYTES # BLD AUTO: 0.14 THOU/MM3 (ref 0–0.07)
IMM GRANULOCYTES NFR BLD AUTO: 0.5 %
LYMPHOCYTES ABSOLUTE: 1.7 THOU/MM3 (ref 1–4.8)
LYMPHOCYTES NFR BLD AUTO: 6.6 %
MCH RBC QN AUTO: 32.4 PG (ref 26–33)
MCHC RBC AUTO-ENTMCNC: 34 GM/DL (ref 32.2–35.5)
MCV RBC AUTO: 95.1 FL (ref 80–94)
MITOCHONDRIAL M2 AB, IGG: < 0.5 U/ML (ref 0–4)
MONOCYTES ABSOLUTE: 0.6 THOU/MM3 (ref 0.4–1.3)
MONOCYTES NFR BLD AUTO: 2.3 %
NEUTROPHILS ABSOLUTE: 8.9 THOU/MM3 (ref 1.8–7.7)
NEUTROPHILS NFR BLD AUTO: 34.2 %
NRBC BLD AUTO-RTO: 0 /100 WBC
PLATELET # BLD AUTO: 225 THOU/MM3 (ref 130–400)
PLATELET BLD QL SMEAR: ADEQUATE
PMV BLD AUTO: 9.5 FL (ref 9.4–12.4)
POTASSIUM SERPL-SCNC: 3.7 MEQ/L (ref 3.5–5.2)
PROT SERPL-MCNC: 5.1 G/DL (ref 6.4–8.3)
RBC # BLD AUTO: 3.46 MILL/MM3 (ref 4.7–6.1)
SCAN OF BLOOD SMEAR: NORMAL
SODIUM SERPL-SCNC: 135 MEQ/L (ref 135–145)
WBC # BLD AUTO: 26 THOU/MM3 (ref 4.8–10.8)

## 2025-08-22 PROCEDURE — 6360000004 HC RX CONTRAST MEDICATION

## 2025-08-22 PROCEDURE — 92526 ORAL FUNCTION THERAPY: CPT

## 2025-08-22 PROCEDURE — 99233 SBSQ HOSP IP/OBS HIGH 50: CPT | Performed by: NURSE PRACTITIONER

## 2025-08-22 PROCEDURE — 92611 MOTION FLUOROSCOPY/SWALLOW: CPT

## 2025-08-22 PROCEDURE — 36415 COLL VENOUS BLD VENIPUNCTURE: CPT

## 2025-08-22 PROCEDURE — 80076 HEPATIC FUNCTION PANEL: CPT

## 2025-08-22 PROCEDURE — 2500000003 HC RX 250 WO HCPCS

## 2025-08-22 PROCEDURE — A9579 GAD-BASE MR CONTRAST NOS,1ML: HCPCS

## 2025-08-22 PROCEDURE — 80048 BASIC METABOLIC PNL TOTAL CA: CPT

## 2025-08-22 PROCEDURE — G0378 HOSPITAL OBSERVATION PER HR: HCPCS

## 2025-08-22 PROCEDURE — 85025 COMPLETE CBC W/AUTO DIFF WBC: CPT

## 2025-08-22 PROCEDURE — 82105 ALPHA-FETOPROTEIN SERUM: CPT

## 2025-08-22 PROCEDURE — 6370000000 HC RX 637 (ALT 250 FOR IP)

## 2025-08-22 PROCEDURE — 6360000002 HC RX W HCPCS

## 2025-08-22 PROCEDURE — 74183 MRI ABD W/O CNTR FLWD CNTR: CPT

## 2025-08-22 PROCEDURE — 96372 THER/PROPH/DIAG INJ SC/IM: CPT

## 2025-08-22 PROCEDURE — 82785 ASSAY OF IGE: CPT

## 2025-08-22 PROCEDURE — 2500000003 HC RX 250 WO HCPCS: Performed by: NURSE PRACTITIONER

## 2025-08-22 PROCEDURE — 74230 X-RAY XM SWLNG FUNCJ C+: CPT

## 2025-08-22 RX ORDER — GADOTERIDOL 279.3 MG/ML
20 INJECTION INTRAVENOUS
Status: COMPLETED | OUTPATIENT
Start: 2025-08-22 | End: 2025-08-22

## 2025-08-22 RX ORDER — POLYVINYL ALCOHOL 14 MG/ML
1 SOLUTION/ DROPS OPHTHALMIC PRN
Status: DISCONTINUED | OUTPATIENT
Start: 2025-08-22 | End: 2025-08-24 | Stop reason: HOSPADM

## 2025-08-22 RX ADMIN — LISINOPRIL 40 MG: 40 TABLET ORAL at 08:51

## 2025-08-22 RX ADMIN — GADOTERIDOL 20 ML: 279.3 INJECTION, SOLUTION INTRAVENOUS at 13:52

## 2025-08-22 RX ADMIN — DOXAZOSIN 4 MG: 4 TABLET ORAL at 08:51

## 2025-08-22 RX ADMIN — ENOXAPARIN SODIUM 40 MG: 100 INJECTION SUBCUTANEOUS at 19:04

## 2025-08-22 RX ADMIN — SODIUM CHLORIDE, PRESERVATIVE FREE 10 ML: 5 INJECTION INTRAVENOUS at 08:51

## 2025-08-22 RX ADMIN — AMLODIPINE BESYLATE 10 MG: 10 TABLET ORAL at 08:51

## 2025-08-22 RX ADMIN — BARIUM SULFATE 40 ML: 400 SUSPENSION ORAL at 09:17

## 2025-08-22 RX ADMIN — BARIUM SULFATE 50 ML: 0.81 POWDER, FOR SUSPENSION ORAL at 09:17

## 2025-08-22 RX ADMIN — SODIUM CHLORIDE, PRESERVATIVE FREE 10 ML: 5 INJECTION INTRAVENOUS at 20:29

## 2025-08-22 RX ADMIN — CLOPIDOGREL BISULFATE 75 MG: 75 TABLET, FILM COATED ORAL at 08:51

## 2025-08-22 RX ADMIN — BARIUM SULFATE 20 ML: 400 PASTE ORAL at 09:17

## 2025-08-22 RX ADMIN — ASPIRIN 81 MG: 81 TABLET, COATED ORAL at 08:51

## 2025-08-22 ASSESSMENT — PAIN SCALES - GENERAL
PAINLEVEL_OUTOF10: 0

## 2025-08-23 PROBLEM — R53.1 WEAKNESS: Status: ACTIVE | Noted: 2025-08-23

## 2025-08-23 LAB
AFP SERPL-MCNC: < 1.8 UG/L
ALBUMIN SERPL BCG-MCNC: 2.9 G/DL (ref 3.4–4.9)
ALP SERPL-CCNC: 359 U/L (ref 40–129)
ALT SERPL W/O P-5'-P-CCNC: 22 U/L (ref 10–50)
ANION GAP SERPL CALC-SCNC: 9 MEQ/L (ref 8–16)
AST SERPL-CCNC: 25 U/L (ref 10–50)
BASOPHILS ABSOLUTE: 0.1 THOU/MM3 (ref 0–0.1)
BASOPHILS NFR BLD AUTO: 0.3 %
BILIRUB CONJ SERPL-MCNC: 0.7 MG/DL (ref 0–0.2)
BILIRUB SERPL-MCNC: 0.8 MG/DL (ref 0.3–1.2)
BUN SERPL-MCNC: 4 MG/DL (ref 8–23)
CALCIUM SERPL-MCNC: 8.7 MG/DL (ref 8.5–10.5)
CHLORIDE SERPL-SCNC: 103 MEQ/L (ref 98–111)
CO2 SERPL-SCNC: 25 MEQ/L (ref 22–29)
CREAT SERPL-MCNC: 0.5 MG/DL (ref 0.7–1.2)
DEPRECATED RDW RBC AUTO: 48.3 FL (ref 35–45)
EOSINOPHIL NFR BLD AUTO: 63.7 %
EOSINOPHILS ABSOLUTE: 16.2 THOU/MM3 (ref 0–0.4)
ERYTHROCYTE [DISTWIDTH] IN BLOOD BY AUTOMATED COUNT: 13.8 % (ref 11.5–14.5)
GFR SERPL CREATININE-BSD FRML MDRD: > 90 ML/MIN/1.73M2
GLUCOSE SERPL-MCNC: 88 MG/DL (ref 74–109)
HCT VFR BLD AUTO: 33.3 % (ref 42–52)
HGB BLD-MCNC: 11.6 GM/DL (ref 14–18)
IMM GRANULOCYTES # BLD AUTO: 0.12 THOU/MM3 (ref 0–0.07)
IMM GRANULOCYTES NFR BLD AUTO: 0.5 %
LYMPHOCYTES ABSOLUTE: 1.6 THOU/MM3 (ref 1–4.8)
LYMPHOCYTES NFR BLD AUTO: 6.4 %
MCH RBC QN AUTO: 33 PG (ref 26–33)
MCHC RBC AUTO-ENTMCNC: 34.8 GM/DL (ref 32.2–35.5)
MCV RBC AUTO: 94.9 FL (ref 80–94)
MONOCYTES ABSOLUTE: 0.6 THOU/MM3 (ref 0.4–1.3)
MONOCYTES NFR BLD AUTO: 2.2 %
NEUTROPHILS ABSOLUTE: 6.8 THOU/MM3 (ref 1.8–7.7)
NEUTROPHILS NFR BLD AUTO: 26.9 %
NRBC BLD AUTO-RTO: 0 /100 WBC
PLATELET # BLD AUTO: 223 THOU/MM3 (ref 130–400)
PLATELET BLD QL SMEAR: ADEQUATE
PMV BLD AUTO: 9.4 FL (ref 9.4–12.4)
POTASSIUM SERPL-SCNC: 3.8 MEQ/L (ref 3.5–5.2)
PROT SERPL-MCNC: 5.3 G/DL (ref 6.4–8.3)
RBC # BLD AUTO: 3.51 MILL/MM3 (ref 4.7–6.1)
SCAN OF BLOOD SMEAR: NORMAL
SODIUM SERPL-SCNC: 137 MEQ/L (ref 135–145)
VARIANT LYMPHS BLD QL SMEAR: ABNORMAL %
WBC # BLD AUTO: 25.4 THOU/MM3 (ref 4.8–10.8)

## 2025-08-23 PROCEDURE — 36415 COLL VENOUS BLD VENIPUNCTURE: CPT

## 2025-08-23 PROCEDURE — 80053 COMPREHEN METABOLIC PANEL: CPT

## 2025-08-23 PROCEDURE — 2500000003 HC RX 250 WO HCPCS

## 2025-08-23 PROCEDURE — 6360000002 HC RX W HCPCS

## 2025-08-23 PROCEDURE — 94760 N-INVAS EAR/PLS OXIMETRY 1: CPT

## 2025-08-23 PROCEDURE — 84075 ASSAY ALKALINE PHOSPHATASE: CPT

## 2025-08-23 PROCEDURE — 85025 COMPLETE CBC W/AUTO DIFF WBC: CPT

## 2025-08-23 PROCEDURE — 6370000000 HC RX 637 (ALT 250 FOR IP): Performed by: NURSE PRACTITIONER

## 2025-08-23 PROCEDURE — 6370000000 HC RX 637 (ALT 250 FOR IP)

## 2025-08-23 PROCEDURE — 99232 SBSQ HOSP IP/OBS MODERATE 35: CPT | Performed by: NURSE PRACTITIONER

## 2025-08-23 PROCEDURE — 1200000000 HC SEMI PRIVATE

## 2025-08-23 PROCEDURE — 84080 ASSAY ALKALINE PHOSPHATASES: CPT

## 2025-08-23 PROCEDURE — 94669 MECHANICAL CHEST WALL OSCILL: CPT

## 2025-08-23 PROCEDURE — 96372 THER/PROPH/DIAG INJ SC/IM: CPT

## 2025-08-23 RX ADMIN — SODIUM CHLORIDE, PRESERVATIVE FREE 10 ML: 5 INJECTION INTRAVENOUS at 10:58

## 2025-08-23 RX ADMIN — DOXAZOSIN 4 MG: 4 TABLET ORAL at 10:57

## 2025-08-23 RX ADMIN — ENOXAPARIN SODIUM 40 MG: 100 INJECTION SUBCUTANEOUS at 17:01

## 2025-08-23 RX ADMIN — PREDNISONE 45 MG: 20 TABLET ORAL at 18:38

## 2025-08-23 RX ADMIN — POLYVINYL ALCOHOL 1 DROP: 1.4 SOLUTION/ DROPS OPHTHALMIC at 11:02

## 2025-08-23 RX ADMIN — LISINOPRIL 40 MG: 40 TABLET ORAL at 10:57

## 2025-08-23 RX ADMIN — SODIUM CHLORIDE, PRESERVATIVE FREE 10 ML: 5 INJECTION INTRAVENOUS at 19:40

## 2025-08-23 RX ADMIN — ACETAMINOPHEN 650 MG: 325 TABLET ORAL at 10:57

## 2025-08-23 RX ADMIN — AMLODIPINE BESYLATE 10 MG: 10 TABLET ORAL at 10:57

## 2025-08-23 ASSESSMENT — PAIN SCALES - GENERAL
PAINLEVEL_OUTOF10: 4
PAINLEVEL_OUTOF10: 0

## 2025-08-23 ASSESSMENT — PAIN DESCRIPTION - LOCATION: LOCATION: GENERALIZED

## 2025-08-23 ASSESSMENT — PAIN - FUNCTIONAL ASSESSMENT
PAIN_FUNCTIONAL_ASSESSMENT: 0-10
PAIN_FUNCTIONAL_ASSESSMENT: ACTIVITIES ARE NOT PREVENTED

## 2025-08-23 ASSESSMENT — PAIN DESCRIPTION - DESCRIPTORS: DESCRIPTORS: ACHING

## 2025-08-23 ASSESSMENT — PAIN DESCRIPTION - ORIENTATION: ORIENTATION: OTHER (COMMENT)

## 2025-08-24 VITALS
HEART RATE: 87 BPM | SYSTOLIC BLOOD PRESSURE: 111 MMHG | HEIGHT: 71 IN | RESPIRATION RATE: 18 BRPM | TEMPERATURE: 98.4 F | WEIGHT: 203 LBS | DIASTOLIC BLOOD PRESSURE: 60 MMHG | BODY MASS INDEX: 28.42 KG/M2 | OXYGEN SATURATION: 98 %

## 2025-08-24 LAB
ALBUMIN SERPL BCG-MCNC: 2.9 G/DL (ref 3.4–4.9)
ALP SERPL-CCNC: 337 U/L (ref 40–129)
ALT SERPL W/O P-5'-P-CCNC: 22 U/L (ref 10–50)
ANION GAP SERPL CALC-SCNC: 10 MEQ/L (ref 8–16)
AST SERPL-CCNC: 20 U/L (ref 10–50)
BASOPHILS ABSOLUTE: 0 THOU/MM3 (ref 0–0.1)
BASOPHILS NFR BLD AUTO: 0.3 %
BILIRUB CONJ SERPL-MCNC: 0.6 MG/DL (ref 0–0.2)
BILIRUB SERPL-MCNC: 0.8 MG/DL (ref 0.3–1.2)
BUN SERPL-MCNC: 5 MG/DL (ref 8–23)
CALCIUM SERPL-MCNC: 8.7 MG/DL (ref 8.5–10.5)
CERULOPLASMIN SERPL-MCNC: 23 MG/DL (ref 15–30)
CHLORIDE SERPL-SCNC: 102 MEQ/L (ref 98–111)
CO2 SERPL-SCNC: 23 MEQ/L (ref 22–29)
CREAT SERPL-MCNC: 0.5 MG/DL (ref 0.7–1.2)
DEPRECATED RDW RBC AUTO: 47 FL (ref 35–45)
EOSINOPHIL NFR BLD AUTO: 19.7 %
EOSINOPHILS ABSOLUTE: 2.8 THOU/MM3 (ref 0–0.4)
ERYTHROCYTE [DISTWIDTH] IN BLOOD BY AUTOMATED COUNT: 13.5 % (ref 11.5–14.5)
GFR SERPL CREATININE-BSD FRML MDRD: > 90 ML/MIN/1.73M2
GLUCOSE SERPL-MCNC: 134 MG/DL (ref 74–109)
HCT VFR BLD AUTO: 35.1 % (ref 42–52)
HGB BLD-MCNC: 11.8 GM/DL (ref 14–18)
IMM GRANULOCYTES # BLD AUTO: 0.14 THOU/MM3 (ref 0–0.07)
IMM GRANULOCYTES NFR BLD AUTO: 1 %
LYMPHOCYTES ABSOLUTE: 1.5 THOU/MM3 (ref 1–4.8)
LYMPHOCYTES NFR BLD AUTO: 10.7 %
MCH RBC QN AUTO: 32 PG (ref 26–33)
MCHC RBC AUTO-ENTMCNC: 33.6 GM/DL (ref 32.2–35.5)
MCV RBC AUTO: 95.1 FL (ref 80–94)
MONOCYTES ABSOLUTE: 0.4 THOU/MM3 (ref 0.4–1.3)
MONOCYTES NFR BLD AUTO: 3 %
NEUTROPHILS ABSOLUTE: 9.4 THOU/MM3 (ref 1.8–7.7)
NEUTROPHILS NFR BLD AUTO: 65.3 %
NRBC BLD AUTO-RTO: 0 /100 WBC
NUCLEAR IGG SER QL IA: NORMAL
PLATELET # BLD AUTO: 238 THOU/MM3 (ref 130–400)
PLATELET BLD QL SMEAR: ADEQUATE
PMV BLD AUTO: 9.5 FL (ref 9.4–12.4)
POTASSIUM SERPL-SCNC: 4.2 MEQ/L (ref 3.5–5.2)
PROT SERPL-MCNC: 5.5 G/DL (ref 6.4–8.3)
RBC # BLD AUTO: 3.69 MILL/MM3 (ref 4.7–6.1)
SCAN OF BLOOD SMEAR: NORMAL
SMA IGG SER-ACNC: 6 UNITS (ref 0–19)
SODIUM SERPL-SCNC: 135 MEQ/L (ref 135–145)
WBC # BLD AUTO: 14.4 THOU/MM3 (ref 4.8–10.8)

## 2025-08-24 PROCEDURE — 2500000003 HC RX 250 WO HCPCS

## 2025-08-24 PROCEDURE — 85025 COMPLETE CBC W/AUTO DIFF WBC: CPT

## 2025-08-24 PROCEDURE — 80053 COMPREHEN METABOLIC PANEL: CPT

## 2025-08-24 PROCEDURE — 6370000000 HC RX 637 (ALT 250 FOR IP)

## 2025-08-24 PROCEDURE — 99239 HOSP IP/OBS DSCHRG MGMT >30: CPT | Performed by: NURSE PRACTITIONER

## 2025-08-24 PROCEDURE — 36415 COLL VENOUS BLD VENIPUNCTURE: CPT

## 2025-08-24 RX ORDER — PREDNISONE 5 MG/1
TABLET ORAL
Qty: 510 TABLET | Refills: 0 | Status: SHIPPED | OUTPATIENT
Start: 2025-08-24 | End: 2025-08-24

## 2025-08-24 RX ORDER — PREDNISONE 5 MG/1
TABLET ORAL
Qty: 116 TABLET | Refills: 0 | Status: SHIPPED | OUTPATIENT
Start: 2025-08-24

## 2025-08-24 RX ADMIN — POLYVINYL ALCOHOL 1 DROP: 1.4 SOLUTION/ DROPS OPHTHALMIC at 09:08

## 2025-08-24 RX ADMIN — LISINOPRIL 40 MG: 40 TABLET ORAL at 09:08

## 2025-08-24 RX ADMIN — SODIUM CHLORIDE, PRESERVATIVE FREE 10 ML: 5 INJECTION INTRAVENOUS at 09:08

## 2025-08-24 RX ADMIN — DOXAZOSIN 4 MG: 4 TABLET ORAL at 09:08

## 2025-08-24 RX ADMIN — AMLODIPINE BESYLATE 10 MG: 10 TABLET ORAL at 09:08

## 2025-08-24 ASSESSMENT — PAIN SCALES - GENERAL
PAINLEVEL_OUTOF10: 0
PAINLEVEL_OUTOF10: 0

## 2025-08-25 LAB
BACTERIA BLD AEROBE CULT: NORMAL
BACTERIA BLD AEROBE CULT: NORMAL
LKM AB TITR SER IF: NORMAL {TITER}

## 2025-08-27 LAB — ALKALINE PHOSPHATASE ISOENZYMES: NORMAL

## 2025-09-03 ENCOUNTER — LAB (OUTPATIENT)
Dept: LAB | Age: 75
End: 2025-09-03

## 2025-09-03 DIAGNOSIS — R79.89 ELEVATED LFTS: ICD-10-CM

## 2025-09-03 LAB
ALBUMIN SERPL BCG-MCNC: 3.7 G/DL (ref 3.4–4.9)
ALP SERPL-CCNC: 182 U/L (ref 40–129)
ALT SERPL W/O P-5'-P-CCNC: 60 U/L (ref 10–50)
AST SERPL-CCNC: 34 U/L (ref 10–50)
BILIRUB CONJ SERPL-MCNC: 0.6 MG/DL (ref 0–0.2)
BILIRUB SERPL-MCNC: 0.8 MG/DL (ref 0.3–1.2)
PROT SERPL-MCNC: 6.1 G/DL (ref 6.4–8.3)

## 2025-09-04 ENCOUNTER — HOSPITAL ENCOUNTER (OUTPATIENT)
Dept: ULTRASOUND IMAGING | Age: 75
Discharge: HOME | End: 2025-09-04
Payer: MEDICARE

## 2025-09-04 VITALS
WEIGHT: 190 LBS | HEART RATE: 81 BPM | HEIGHT: 71 IN | RESPIRATION RATE: 14 BRPM | BODY MASS INDEX: 26.6 KG/M2 | OXYGEN SATURATION: 98 % | DIASTOLIC BLOOD PRESSURE: 71 MMHG | SYSTOLIC BLOOD PRESSURE: 161 MMHG | TEMPERATURE: 97.6 F

## 2025-09-04 DIAGNOSIS — K76.9 LIVER LESION: ICD-10-CM

## 2025-09-04 LAB
APTT PPP: 22.5 SECONDS (ref 22–38)
CREAT SERPL-MCNC: 0.7 MG/DL (ref 0.7–1.2)
DEPRECATED RDW RBC AUTO: 50.1 FL (ref 35–45)
ERYTHROCYTE [DISTWIDTH] IN BLOOD BY AUTOMATED COUNT: 14.5 % (ref 11.5–14.5)
GFR SERPL CREATININE-BSD FRML MDRD: > 90 ML/MIN/1.73M2
HCT VFR BLD AUTO: 36.9 % (ref 42–52)
HGB BLD-MCNC: 12.5 GM/DL (ref 14–18)
INR PPP: 1.07 (ref 0.85–1.13)
MCH RBC QN AUTO: 32.1 PG (ref 26–33)
MCHC RBC AUTO-ENTMCNC: 33.9 GM/DL (ref 32.2–35.5)
MCV RBC AUTO: 94.9 FL (ref 80–94)
PLATELET # BLD AUTO: 206 THOU/MM3 (ref 130–400)
PMV BLD AUTO: 9.8 FL (ref 9.4–12.4)
PROTHROMBIN TIME: 12.5 SECONDS (ref 10–13.5)
RBC # BLD AUTO: 3.89 MILL/MM3 (ref 4.7–6.1)
WBC # BLD AUTO: 8.4 THOU/MM3 (ref 4.8–10.8)

## 2025-09-04 PROCEDURE — 85730 THROMBOPLASTIN TIME PARTIAL: CPT

## 2025-09-04 PROCEDURE — 85027 COMPLETE CBC AUTOMATED: CPT

## 2025-09-04 PROCEDURE — 76942 ECHO GUIDE FOR BIOPSY: CPT

## 2025-09-04 PROCEDURE — 85610 PROTHROMBIN TIME: CPT

## 2025-09-04 PROCEDURE — 88333 PATH CONSLTJ SURG CYTO XM 1: CPT

## 2025-09-04 PROCEDURE — 6360000002 HC RX W HCPCS: Performed by: RADIOLOGY

## 2025-09-04 PROCEDURE — 88307 TISSUE EXAM BY PATHOLOGIST: CPT

## 2025-09-04 PROCEDURE — 88334 PATH CONSLTJ SURG CYTO XM EA: CPT

## 2025-09-04 PROCEDURE — 36415 COLL VENOUS BLD VENIPUNCTURE: CPT

## 2025-09-04 PROCEDURE — 82565 ASSAY OF CREATININE: CPT

## 2025-09-04 PROCEDURE — 88313 SPECIAL STAINS GROUP 2: CPT

## 2025-09-04 PROCEDURE — 2580000003 HC RX 258: Performed by: RADIOLOGY

## 2025-09-04 RX ORDER — FENTANYL CITRATE 50 UG/ML
INJECTION, SOLUTION INTRAMUSCULAR; INTRAVENOUS PRN
Status: COMPLETED | OUTPATIENT
Start: 2025-09-04 | End: 2025-09-04

## 2025-09-04 RX ORDER — MIDAZOLAM HYDROCHLORIDE 1 MG/ML
1 INJECTION, SOLUTION INTRAMUSCULAR; INTRAVENOUS ONCE
Status: COMPLETED | OUTPATIENT
Start: 2025-09-04 | End: 2025-09-04

## 2025-09-04 RX ORDER — MIDAZOLAM HYDROCHLORIDE 1 MG/ML
INJECTION, SOLUTION INTRAMUSCULAR; INTRAVENOUS PRN
Status: COMPLETED | OUTPATIENT
Start: 2025-09-04 | End: 2025-09-04

## 2025-09-04 RX ORDER — FENTANYL CITRATE 50 UG/ML
50 INJECTION, SOLUTION INTRAMUSCULAR; INTRAVENOUS ONCE
Status: COMPLETED | OUTPATIENT
Start: 2025-09-04 | End: 2025-09-04

## 2025-09-04 RX ORDER — SODIUM CHLORIDE 450 MG/100ML
INJECTION, SOLUTION INTRAVENOUS CONTINUOUS
Status: DISCONTINUED | OUTPATIENT
Start: 2025-09-04 | End: 2025-09-05 | Stop reason: HOSPADM

## 2025-09-04 RX ADMIN — MIDAZOLAM HYDROCHLORIDE 1 MG: 1 INJECTION, SOLUTION INTRAMUSCULAR; INTRAVENOUS at 08:35

## 2025-09-04 RX ADMIN — FENTANYL CITRATE 50 MCG: 50 INJECTION, SOLUTION INTRAMUSCULAR; INTRAVENOUS at 08:48

## 2025-09-04 RX ADMIN — MIDAZOLAM 1 MG: 1 INJECTION INTRAMUSCULAR; INTRAVENOUS at 08:48

## 2025-09-04 RX ADMIN — FENTANYL CITRATE 50 MCG: 50 INJECTION INTRAMUSCULAR; INTRAVENOUS at 08:35

## 2025-09-04 RX ADMIN — SODIUM CHLORIDE: 0.45 INJECTION, SOLUTION INTRAVENOUS at 06:57

## 2025-09-04 ASSESSMENT — PAIN SCALES - GENERAL
PAINLEVEL_OUTOF10: 0
PAINLEVEL_OUTOF10: 0

## 2025-09-04 ASSESSMENT — PAIN - FUNCTIONAL ASSESSMENT: PAIN_FUNCTIONAL_ASSESSMENT: 0-10
